# Patient Record
Sex: FEMALE | Race: WHITE | NOT HISPANIC OR LATINO | URBAN - METROPOLITAN AREA
[De-identification: names, ages, dates, MRNs, and addresses within clinical notes are randomized per-mention and may not be internally consistent; named-entity substitution may affect disease eponyms.]

---

## 2017-01-02 ENCOUNTER — INPATIENT (INPATIENT)
Facility: HOSPITAL | Age: 82
LOS: 8 days | Discharge: HOME | DRG: 871 | End: 2017-01-11
Admitting: STUDENT IN AN ORGANIZED HEALTH CARE EDUCATION/TRAINING PROGRAM
Payer: MEDICARE

## 2017-01-02 DIAGNOSIS — Z90.49 ACQUIRED ABSENCE OF OTHER SPECIFIED PARTS OF DIGESTIVE TRACT: Chronic | ICD-10-CM

## 2017-01-02 DIAGNOSIS — Z90.710 ACQUIRED ABSENCE OF BOTH CERVIX AND UTERUS: Chronic | ICD-10-CM

## 2017-01-02 DIAGNOSIS — Z90.89 ACQUIRED ABSENCE OF OTHER ORGANS: Chronic | ICD-10-CM

## 2017-01-02 DIAGNOSIS — Z98.89 OTHER SPECIFIED POSTPROCEDURAL STATES: Chronic | ICD-10-CM

## 2017-01-02 DIAGNOSIS — Z66 DO NOT RESUSCITATE: ICD-10-CM

## 2017-01-02 DIAGNOSIS — Z87.891 PERSONAL HISTORY OF NICOTINE DEPENDENCE: ICD-10-CM

## 2017-01-02 DIAGNOSIS — A41.9 SEPSIS, UNSPECIFIED ORGANISM: ICD-10-CM

## 2017-01-02 DIAGNOSIS — I48.2 CHRONIC ATRIAL FIBRILLATION: ICD-10-CM

## 2017-01-02 DIAGNOSIS — K21.9 GASTRO-ESOPHAGEAL REFLUX DISEASE WITHOUT ESOPHAGITIS: ICD-10-CM

## 2017-01-02 DIAGNOSIS — A41.89 OTHER SPECIFIED SEPSIS: ICD-10-CM

## 2017-01-02 DIAGNOSIS — N17.0 ACUTE KIDNEY FAILURE WITH TUBULAR NECROSIS: ICD-10-CM

## 2017-01-02 DIAGNOSIS — Z96.659 PRESENCE OF UNSPECIFIED ARTIFICIAL KNEE JOINT: ICD-10-CM

## 2017-01-02 DIAGNOSIS — J18.9 PNEUMONIA, UNSPECIFIED ORGANISM: ICD-10-CM

## 2017-01-02 DIAGNOSIS — E87.6 HYPOKALEMIA: ICD-10-CM

## 2017-01-02 PROCEDURE — 71010: CPT | Mod: 26

## 2017-01-02 PROCEDURE — 93010 ELECTROCARDIOGRAM REPORT: CPT

## 2017-01-02 PROCEDURE — 99223 1ST HOSP IP/OBS HIGH 75: CPT

## 2017-01-03 PROCEDURE — 99223 1ST HOSP IP/OBS HIGH 75: CPT

## 2017-01-03 PROCEDURE — 93010 ELECTROCARDIOGRAM REPORT: CPT

## 2017-01-03 PROCEDURE — 99233 SBSQ HOSP IP/OBS HIGH 50: CPT

## 2017-01-04 PROCEDURE — 71010: CPT | Mod: 26

## 2017-01-04 PROCEDURE — 78582 LUNG VENTILAT&PERFUS IMAGING: CPT | Mod: 26

## 2017-01-04 PROCEDURE — 93306 TTE W/DOPPLER COMPLETE: CPT | Mod: 26

## 2017-01-04 PROCEDURE — 99232 SBSQ HOSP IP/OBS MODERATE 35: CPT

## 2017-01-05 PROCEDURE — 99233 SBSQ HOSP IP/OBS HIGH 50: CPT

## 2017-01-06 PROCEDURE — 99232 SBSQ HOSP IP/OBS MODERATE 35: CPT

## 2017-01-06 PROCEDURE — 99233 SBSQ HOSP IP/OBS HIGH 50: CPT

## 2017-01-06 PROCEDURE — 71010: CPT | Mod: 26

## 2017-01-06 PROCEDURE — 93010 ELECTROCARDIOGRAM REPORT: CPT

## 2017-01-07 PROCEDURE — 99233 SBSQ HOSP IP/OBS HIGH 50: CPT

## 2017-01-07 PROCEDURE — 93010 ELECTROCARDIOGRAM REPORT: CPT

## 2017-01-07 PROCEDURE — 99232 SBSQ HOSP IP/OBS MODERATE 35: CPT

## 2017-01-08 PROCEDURE — 99233 SBSQ HOSP IP/OBS HIGH 50: CPT

## 2017-01-08 PROCEDURE — 99232 SBSQ HOSP IP/OBS MODERATE 35: CPT

## 2017-01-09 PROCEDURE — 99232 SBSQ HOSP IP/OBS MODERATE 35: CPT

## 2017-01-09 PROCEDURE — 99233 SBSQ HOSP IP/OBS HIGH 50: CPT

## 2017-01-10 PROCEDURE — 78582 LUNG VENTILAT&PERFUS IMAGING: CPT

## 2017-01-10 PROCEDURE — 99233 SBSQ HOSP IP/OBS HIGH 50: CPT

## 2017-01-10 PROCEDURE — 99232 SBSQ HOSP IP/OBS MODERATE 35: CPT

## 2017-01-10 PROCEDURE — 85730 THROMBOPLASTIN TIME PARTIAL: CPT

## 2017-01-10 PROCEDURE — 83735 ASSAY OF MAGNESIUM: CPT

## 2017-01-10 PROCEDURE — 87493 C DIFF AMPLIFIED PROBE: CPT

## 2017-01-10 PROCEDURE — 96375 TX/PRO/DX INJ NEW DRUG ADDON: CPT

## 2017-01-10 PROCEDURE — 96374 THER/PROPH/DIAG INJ IV PUSH: CPT

## 2017-01-10 PROCEDURE — 80076 HEPATIC FUNCTION PANEL: CPT

## 2017-01-10 PROCEDURE — 85025 COMPLETE CBC W/AUTO DIFF WBC: CPT

## 2017-01-10 PROCEDURE — 94640 AIRWAY INHALATION TREATMENT: CPT

## 2017-01-10 PROCEDURE — 80069 RENAL FUNCTION PANEL: CPT

## 2017-01-10 PROCEDURE — 85027 COMPLETE CBC AUTOMATED: CPT

## 2017-01-10 PROCEDURE — 85610 PROTHROMBIN TIME: CPT

## 2017-01-10 PROCEDURE — 83605 ASSAY OF LACTIC ACID: CPT

## 2017-01-10 PROCEDURE — A9540: CPT

## 2017-01-10 PROCEDURE — A9567: CPT

## 2017-01-10 PROCEDURE — 93306 TTE W/DOPPLER COMPLETE: CPT

## 2017-01-10 PROCEDURE — 80048 BASIC METABOLIC PNL TOTAL CA: CPT

## 2017-01-10 PROCEDURE — 87086 URINE CULTURE/COLONY COUNT: CPT

## 2017-01-10 PROCEDURE — 80053 COMPREHEN METABOLIC PANEL: CPT

## 2017-01-10 PROCEDURE — 99285 EMERGENCY DEPT VISIT HI MDM: CPT | Mod: 25

## 2017-01-10 PROCEDURE — 81003 URINALYSIS AUTO W/O SCOPE: CPT

## 2017-01-10 PROCEDURE — 71045 X-RAY EXAM CHEST 1 VIEW: CPT

## 2017-01-10 PROCEDURE — 93005 ELECTROCARDIOGRAM TRACING: CPT

## 2017-01-10 PROCEDURE — 83036 HEMOGLOBIN GLYCOSYLATED A1C: CPT

## 2017-01-10 PROCEDURE — 97001: CPT

## 2017-01-10 PROCEDURE — 84443 ASSAY THYROID STIM HORMONE: CPT

## 2017-01-10 PROCEDURE — 87040 BLOOD CULTURE FOR BACTERIA: CPT

## 2017-01-10 PROCEDURE — 97162 PT EVAL MOD COMPLEX 30 MIN: CPT

## 2017-01-10 PROCEDURE — 97116 GAIT TRAINING THERAPY: CPT

## 2017-01-11 PROCEDURE — 99233 SBSQ HOSP IP/OBS HIGH 50: CPT

## 2017-01-12 ENCOUNTER — MEDICATION RENEWAL (OUTPATIENT)
Age: 82
End: 2017-01-12

## 2017-01-17 RX ORDER — POTASSIUM CHLORIDE 1500 MG/1
20 TABLET, FILM COATED, EXTENDED RELEASE ORAL DAILY
Qty: 90 | Refills: 3 | Status: ACTIVE | COMMUNITY
Start: 2017-01-12 | End: 1900-01-01

## 2017-01-31 ENCOUNTER — APPOINTMENT (OUTPATIENT)
Dept: FAMILY MEDICINE | Facility: CLINIC | Age: 82
End: 2017-01-31

## 2017-01-31 VITALS
TEMPERATURE: 98.5 F | SYSTOLIC BLOOD PRESSURE: 128 MMHG | RESPIRATION RATE: 18 BRPM | HEART RATE: 80 BPM | DIASTOLIC BLOOD PRESSURE: 60 MMHG

## 2017-02-07 ENCOUNTER — NON-APPOINTMENT (OUTPATIENT)
Age: 82
End: 2017-02-07

## 2017-02-07 ENCOUNTER — APPOINTMENT (OUTPATIENT)
Dept: CARDIOLOGY | Facility: CLINIC | Age: 82
End: 2017-02-07

## 2017-02-07 VITALS
SYSTOLIC BLOOD PRESSURE: 169 MMHG | BODY MASS INDEX: 23.16 KG/M2 | WEIGHT: 139 LBS | OXYGEN SATURATION: 97 % | RESPIRATION RATE: 17 BRPM | HEART RATE: 64 BPM | HEIGHT: 65 IN | DIASTOLIC BLOOD PRESSURE: 78 MMHG

## 2017-02-07 RX ORDER — DILTIAZEM HYDROCHLORIDE 60 MG/1
60 TABLET ORAL
Qty: 360 | Refills: 3 | Status: DISCONTINUED | COMMUNITY
Start: 2017-01-12 | End: 2017-02-07

## 2017-02-28 ENCOUNTER — APPOINTMENT (OUTPATIENT)
Dept: CARDIOLOGY | Facility: CLINIC | Age: 82
End: 2017-02-28

## 2017-04-13 ENCOUNTER — OTHER (OUTPATIENT)
Age: 82
End: 2017-04-13

## 2017-05-02 ENCOUNTER — RX RENEWAL (OUTPATIENT)
Age: 82
End: 2017-05-02

## 2017-05-05 ENCOUNTER — APPOINTMENT (OUTPATIENT)
Dept: FAMILY MEDICINE | Facility: CLINIC | Age: 82
End: 2017-05-05

## 2017-05-05 VITALS — HEART RATE: 68 BPM | SYSTOLIC BLOOD PRESSURE: 158 MMHG | RESPIRATION RATE: 18 BRPM | DIASTOLIC BLOOD PRESSURE: 90 MMHG

## 2017-05-05 VITALS — DIASTOLIC BLOOD PRESSURE: 98 MMHG | SYSTOLIC BLOOD PRESSURE: 155 MMHG | TEMPERATURE: 98.8 F

## 2017-05-05 DIAGNOSIS — Z00.00 ENCOUNTER FOR GENERAL ADULT MEDICAL EXAMINATION W/OUT ABNORMAL FINDINGS: ICD-10-CM

## 2017-05-05 DIAGNOSIS — M19.90 UNSPECIFIED OSTEOARTHRITIS, UNSPECIFIED SITE: ICD-10-CM

## 2017-05-11 ENCOUNTER — FORM ENCOUNTER (OUTPATIENT)
Age: 82
End: 2017-05-11

## 2017-05-12 ENCOUNTER — APPOINTMENT (OUTPATIENT)
Dept: MAMMOGRAPHY | Facility: HOSPITAL | Age: 82
End: 2017-05-12

## 2017-05-12 ENCOUNTER — OUTPATIENT (OUTPATIENT)
Dept: OUTPATIENT SERVICES | Facility: HOSPITAL | Age: 82
LOS: 1 days | End: 2017-05-12
Payer: MEDICARE

## 2017-05-12 DIAGNOSIS — Z12.31 ENCOUNTER FOR SCREENING MAMMOGRAM FOR MALIGNANT NEOPLASM OF BREAST: ICD-10-CM

## 2017-05-12 DIAGNOSIS — Z90.89 ACQUIRED ABSENCE OF OTHER ORGANS: Chronic | ICD-10-CM

## 2017-05-12 DIAGNOSIS — Z98.89 OTHER SPECIFIED POSTPROCEDURAL STATES: Chronic | ICD-10-CM

## 2017-05-12 DIAGNOSIS — Z90.49 ACQUIRED ABSENCE OF OTHER SPECIFIED PARTS OF DIGESTIVE TRACT: Chronic | ICD-10-CM

## 2017-05-12 DIAGNOSIS — Z90.710 ACQUIRED ABSENCE OF BOTH CERVIX AND UTERUS: Chronic | ICD-10-CM

## 2017-05-12 PROCEDURE — 77063 BREAST TOMOSYNTHESIS BI: CPT

## 2017-05-12 PROCEDURE — 77067 SCR MAMMO BI INCL CAD: CPT

## 2017-07-19 ENCOUNTER — RX RENEWAL (OUTPATIENT)
Age: 82
End: 2017-07-19

## 2017-07-24 ENCOUNTER — MEDICATION RENEWAL (OUTPATIENT)
Age: 82
End: 2017-07-24

## 2017-08-22 ENCOUNTER — APPOINTMENT (OUTPATIENT)
Dept: CARDIOLOGY | Facility: CLINIC | Age: 82
End: 2017-08-22
Payer: MEDICARE

## 2017-08-22 ENCOUNTER — NON-APPOINTMENT (OUTPATIENT)
Age: 82
End: 2017-08-22

## 2017-08-22 VITALS
RESPIRATION RATE: 16 BRPM | BODY MASS INDEX: 25.33 KG/M2 | DIASTOLIC BLOOD PRESSURE: 78 MMHG | HEART RATE: 70 BPM | SYSTOLIC BLOOD PRESSURE: 129 MMHG | WEIGHT: 152 LBS | HEIGHT: 65 IN | OXYGEN SATURATION: 97 %

## 2017-08-22 DIAGNOSIS — Z87.898 PERSONAL HISTORY OF OTHER SPECIFIED CONDITIONS: ICD-10-CM

## 2017-08-22 PROCEDURE — 93000 ELECTROCARDIOGRAM COMPLETE: CPT

## 2017-08-22 PROCEDURE — 99214 OFFICE O/P EST MOD 30 MIN: CPT

## 2017-12-14 ENCOUNTER — RESULT REVIEW (OUTPATIENT)
Age: 82
End: 2017-12-14

## 2017-12-14 ENCOUNTER — INPATIENT (INPATIENT)
Facility: HOSPITAL | Age: 82
LOS: 6 days | Discharge: HOME | DRG: 329 | End: 2017-12-21
Attending: SURGERY | Admitting: SURGERY
Payer: MEDICARE

## 2017-12-14 DIAGNOSIS — R10.9 UNSPECIFIED ABDOMINAL PAIN: ICD-10-CM

## 2017-12-14 DIAGNOSIS — Z90.710 ACQUIRED ABSENCE OF BOTH CERVIX AND UTERUS: Chronic | ICD-10-CM

## 2017-12-14 DIAGNOSIS — Z90.49 ACQUIRED ABSENCE OF OTHER SPECIFIED PARTS OF DIGESTIVE TRACT: Chronic | ICD-10-CM

## 2017-12-14 DIAGNOSIS — Z98.89 OTHER SPECIFIED POSTPROCEDURAL STATES: Chronic | ICD-10-CM

## 2017-12-14 DIAGNOSIS — Z90.89 ACQUIRED ABSENCE OF OTHER ORGANS: Chronic | ICD-10-CM

## 2017-12-14 PROCEDURE — 88307 TISSUE EXAM BY PATHOLOGIST: CPT | Mod: 26

## 2017-12-14 PROCEDURE — 44140 PARTIAL REMOVAL OF COLON: CPT

## 2017-12-14 PROCEDURE — 99223 1ST HOSP IP/OBS HIGH 75: CPT

## 2017-12-14 PROCEDURE — 74177 CT ABD & PELVIS W/CONTRAST: CPT | Mod: 26

## 2017-12-14 PROCEDURE — 99223 1ST HOSP IP/OBS HIGH 75: CPT | Mod: 57,25

## 2017-12-14 PROCEDURE — 93010 ELECTROCARDIOGRAM REPORT: CPT

## 2017-12-15 ENCOUNTER — TRANSCRIPTION ENCOUNTER (OUTPATIENT)
Age: 82
End: 2017-12-15

## 2017-12-15 PROCEDURE — 93306 TTE W/DOPPLER COMPLETE: CPT | Mod: 26

## 2017-12-15 PROCEDURE — 99232 SBSQ HOSP IP/OBS MODERATE 35: CPT

## 2017-12-16 PROCEDURE — 99223 1ST HOSP IP/OBS HIGH 75: CPT

## 2017-12-16 PROCEDURE — 70450 CT HEAD/BRAIN W/O DYE: CPT | Mod: 26

## 2017-12-16 PROCEDURE — 99232 SBSQ HOSP IP/OBS MODERATE 35: CPT

## 2017-12-17 PROCEDURE — 70450 CT HEAD/BRAIN W/O DYE: CPT | Mod: 26

## 2017-12-17 PROCEDURE — 93880 EXTRACRANIAL BILAT STUDY: CPT | Mod: 26

## 2017-12-17 PROCEDURE — 99231 SBSQ HOSP IP/OBS SF/LOW 25: CPT

## 2017-12-18 PROCEDURE — 99232 SBSQ HOSP IP/OBS MODERATE 35: CPT

## 2017-12-19 PROCEDURE — 99232 SBSQ HOSP IP/OBS MODERATE 35: CPT

## 2017-12-19 PROCEDURE — 71010: CPT | Mod: 26

## 2017-12-21 PROCEDURE — 85027 COMPLETE CBC AUTOMATED: CPT

## 2017-12-21 PROCEDURE — 85730 THROMBOPLASTIN TIME PARTIAL: CPT

## 2017-12-21 PROCEDURE — 80048 BASIC METABOLIC PNL TOTAL CA: CPT

## 2017-12-21 PROCEDURE — 93005 ELECTROCARDIOGRAM TRACING: CPT

## 2017-12-21 PROCEDURE — 71045 X-RAY EXAM CHEST 1 VIEW: CPT

## 2017-12-21 PROCEDURE — C8929: CPT

## 2017-12-21 PROCEDURE — 93880 EXTRACRANIAL BILAT STUDY: CPT

## 2017-12-21 PROCEDURE — 96374 THER/PROPH/DIAG INJ IV PUSH: CPT | Mod: XU

## 2017-12-21 PROCEDURE — 81003 URINALYSIS AUTO W/O SCOPE: CPT

## 2017-12-21 PROCEDURE — 97161 PT EVAL LOW COMPLEX 20 MIN: CPT

## 2017-12-21 PROCEDURE — 82150 ASSAY OF AMYLASE: CPT

## 2017-12-21 PROCEDURE — 96375 TX/PRO/DX INJ NEW DRUG ADDON: CPT

## 2017-12-21 PROCEDURE — 83036 HEMOGLOBIN GLYCOSYLATED A1C: CPT

## 2017-12-21 PROCEDURE — 80069 RENAL FUNCTION PANEL: CPT

## 2017-12-21 PROCEDURE — 97116 GAIT TRAINING THERAPY: CPT

## 2017-12-21 PROCEDURE — 99285 EMERGENCY DEPT VISIT HI MDM: CPT | Mod: 25

## 2017-12-21 PROCEDURE — 87070 CULTURE OTHR SPECIMN AEROBIC: CPT

## 2017-12-21 PROCEDURE — 70450 CT HEAD/BRAIN W/O DYE: CPT

## 2017-12-21 PROCEDURE — 80053 COMPREHEN METABOLIC PANEL: CPT

## 2017-12-21 PROCEDURE — 83721 ASSAY OF BLOOD LIPOPROTEIN: CPT

## 2017-12-21 PROCEDURE — 87075 CULTR BACTERIA EXCEPT BLOOD: CPT

## 2017-12-21 PROCEDURE — 83690 ASSAY OF LIPASE: CPT

## 2017-12-21 PROCEDURE — 88307 TISSUE EXAM BY PATHOLOGIST: CPT

## 2017-12-21 PROCEDURE — 74177 CT ABD & PELVIS W/CONTRAST: CPT

## 2017-12-21 PROCEDURE — 85610 PROTHROMBIN TIME: CPT

## 2017-12-21 PROCEDURE — 87205 SMEAR GRAM STAIN: CPT

## 2017-12-29 ENCOUNTER — APPOINTMENT (OUTPATIENT)
Dept: SURGERY | Facility: CLINIC | Age: 82
End: 2017-12-29
Payer: MEDICARE

## 2017-12-29 VITALS — WEIGHT: 150 LBS | BODY MASS INDEX: 25.61 KG/M2 | HEIGHT: 64 IN

## 2017-12-29 PROCEDURE — 99024 POSTOP FOLLOW-UP VISIT: CPT

## 2017-12-29 RX ORDER — DICYCLOMINE HYDROCHLORIDE 10 MG/1
10 CAPSULE ORAL
Qty: 180 | Refills: 0 | Status: ACTIVE | COMMUNITY
Start: 2017-10-02

## 2017-12-29 RX ORDER — NITROFURANTOIN (MONOHYDRATE/MACROCRYSTALS) 25; 75 MG/1; MG/1
100 CAPSULE ORAL
Qty: 14 | Refills: 0 | Status: ACTIVE | COMMUNITY
Start: 2017-09-11

## 2017-12-29 RX ORDER — POTASSIUM CHLORIDE 1500 MG/1
20 TABLET, EXTENDED RELEASE ORAL
Qty: 90 | Refills: 0 | Status: ACTIVE | COMMUNITY
Start: 2017-01-17

## 2017-12-29 RX ORDER — CEFUROXIME AXETIL 500 MG/1
500 TABLET ORAL
Qty: 14 | Refills: 0 | Status: ACTIVE | COMMUNITY
Start: 2017-07-03

## 2017-12-29 RX ORDER — DORZOLAMIDE HYDROCHLORIDE TIMOLOL MALEATE 20; 5 MG/ML; MG/ML
22.3-6.8 SOLUTION/ DROPS OPHTHALMIC
Qty: 30 | Refills: 0 | Status: ACTIVE | COMMUNITY
Start: 2017-08-28

## 2017-12-29 RX ORDER — DICLOFENAC SODIUM 20 MG/G
2 SOLUTION TOPICAL
Qty: 112 | Refills: 0 | Status: ACTIVE | COMMUNITY
Start: 2017-07-28

## 2017-12-29 RX ORDER — ONDANSETRON 4 MG/1
4 TABLET ORAL
Qty: 9 | Refills: 0 | Status: ACTIVE | COMMUNITY
Start: 2017-12-21

## 2017-12-29 RX ORDER — OXYCODONE AND ACETAMINOPHEN 5; 325 MG/1; MG/1
5-325 TABLET ORAL
Qty: 6 | Refills: 0 | Status: ACTIVE | COMMUNITY
Start: 2017-12-21

## 2017-12-29 RX ORDER — PHENAZOPYRIDINE HYDROCHLORIDE 100 MG/1
100 TABLET ORAL
Qty: 6 | Refills: 0 | Status: ACTIVE | COMMUNITY
Start: 2017-10-02

## 2017-12-29 RX ORDER — FLUOROURACIL 50 MG/G
5 CREAM TOPICAL
Qty: 40 | Refills: 0 | Status: ACTIVE | COMMUNITY
Start: 2017-08-17

## 2018-01-19 ENCOUNTER — APPOINTMENT (OUTPATIENT)
Dept: SURGERY | Facility: CLINIC | Age: 83
End: 2018-01-19
Payer: MEDICARE

## 2018-01-19 PROCEDURE — 99024 POSTOP FOLLOW-UP VISIT: CPT

## 2018-01-20 ENCOUNTER — RX RENEWAL (OUTPATIENT)
Age: 83
End: 2018-01-20

## 2018-03-06 ENCOUNTER — NON-APPOINTMENT (OUTPATIENT)
Age: 83
End: 2018-03-06

## 2018-03-06 ENCOUNTER — APPOINTMENT (OUTPATIENT)
Dept: CARDIOLOGY | Facility: CLINIC | Age: 83
End: 2018-03-06
Payer: MEDICARE

## 2018-03-06 VITALS
OXYGEN SATURATION: 99 % | HEIGHT: 64 IN | RESPIRATION RATE: 16 BRPM | DIASTOLIC BLOOD PRESSURE: 79 MMHG | SYSTOLIC BLOOD PRESSURE: 131 MMHG | HEART RATE: 68 BPM | BODY MASS INDEX: 25.95 KG/M2 | WEIGHT: 152 LBS

## 2018-03-06 DIAGNOSIS — K56.2 VOLVULUS: ICD-10-CM

## 2018-03-06 PROCEDURE — 93000 ELECTROCARDIOGRAM COMPLETE: CPT

## 2018-03-06 PROCEDURE — 99215 OFFICE O/P EST HI 40 MIN: CPT

## 2018-03-06 PROCEDURE — 93306 TTE W/DOPPLER COMPLETE: CPT

## 2018-03-21 ENCOUNTER — APPOINTMENT (OUTPATIENT)
Dept: SURGERY | Facility: CLINIC | Age: 83
End: 2018-03-21

## 2018-04-02 ENCOUNTER — RX RENEWAL (OUTPATIENT)
Age: 83
End: 2018-04-02

## 2018-05-14 ENCOUNTER — APPOINTMENT (OUTPATIENT)
Dept: MAMMOGRAPHY | Facility: HOSPITAL | Age: 83
End: 2018-05-14
Payer: MEDICARE

## 2018-05-14 ENCOUNTER — OUTPATIENT (OUTPATIENT)
Dept: OUTPATIENT SERVICES | Facility: HOSPITAL | Age: 83
LOS: 1 days | End: 2018-05-14
Payer: MEDICARE

## 2018-05-14 DIAGNOSIS — Z90.710 ACQUIRED ABSENCE OF BOTH CERVIX AND UTERUS: Chronic | ICD-10-CM

## 2018-05-14 DIAGNOSIS — Z00.8 ENCOUNTER FOR OTHER GENERAL EXAMINATION: ICD-10-CM

## 2018-05-14 DIAGNOSIS — Z98.89 OTHER SPECIFIED POSTPROCEDURAL STATES: Chronic | ICD-10-CM

## 2018-05-14 DIAGNOSIS — Z90.89 ACQUIRED ABSENCE OF OTHER ORGANS: Chronic | ICD-10-CM

## 2018-05-14 DIAGNOSIS — Z90.49 ACQUIRED ABSENCE OF OTHER SPECIFIED PARTS OF DIGESTIVE TRACT: Chronic | ICD-10-CM

## 2018-05-14 DIAGNOSIS — Z12.31 ENCOUNTER FOR SCREENING MAMMOGRAM FOR MALIGNANT NEOPLASM OF BREAST: ICD-10-CM

## 2018-05-14 PROCEDURE — 77063 BREAST TOMOSYNTHESIS BI: CPT

## 2018-05-14 PROCEDURE — 77067 SCR MAMMO BI INCL CAD: CPT | Mod: 26

## 2018-05-14 PROCEDURE — 77067 SCR MAMMO BI INCL CAD: CPT

## 2018-05-14 PROCEDURE — 77063 BREAST TOMOSYNTHESIS BI: CPT | Mod: 26

## 2018-07-02 ENCOUNTER — RX RENEWAL (OUTPATIENT)
Age: 83
End: 2018-07-02

## 2018-08-14 ENCOUNTER — INPATIENT (INPATIENT)
Facility: HOSPITAL | Age: 83
LOS: 1 days | Discharge: HOME | DRG: 69 | End: 2018-08-16
Attending: HOSPITALIST | Admitting: INTERNAL MEDICINE
Payer: COMMERCIAL

## 2018-08-14 VITALS
HEIGHT: 65 IN | WEIGHT: 149.91 LBS | SYSTOLIC BLOOD PRESSURE: 163 MMHG | TEMPERATURE: 97 F | DIASTOLIC BLOOD PRESSURE: 80 MMHG | OXYGEN SATURATION: 99 % | HEART RATE: 60 BPM | RESPIRATION RATE: 16 BRPM

## 2018-08-14 DIAGNOSIS — Z90.49 ACQUIRED ABSENCE OF OTHER SPECIFIED PARTS OF DIGESTIVE TRACT: Chronic | ICD-10-CM

## 2018-08-14 DIAGNOSIS — H40.9 UNSPECIFIED GLAUCOMA: ICD-10-CM

## 2018-08-14 DIAGNOSIS — Z90.89 ACQUIRED ABSENCE OF OTHER ORGANS: Chronic | ICD-10-CM

## 2018-08-14 DIAGNOSIS — I34.1 NONRHEUMATIC MITRAL (VALVE) PROLAPSE: ICD-10-CM

## 2018-08-14 DIAGNOSIS — Z98.89 OTHER SPECIFIED POSTPROCEDURAL STATES: Chronic | ICD-10-CM

## 2018-08-14 DIAGNOSIS — E78.1 PURE HYPERGLYCERIDEMIA: ICD-10-CM

## 2018-08-14 DIAGNOSIS — I63.9 CEREBRAL INFARCTION, UNSPECIFIED: ICD-10-CM

## 2018-08-14 DIAGNOSIS — Z90.710 ACQUIRED ABSENCE OF BOTH CERVIX AND UTERUS: Chronic | ICD-10-CM

## 2018-08-14 DIAGNOSIS — Z29.9 ENCOUNTER FOR PROPHYLACTIC MEASURES, UNSPECIFIED: ICD-10-CM

## 2018-08-14 DIAGNOSIS — I10 ESSENTIAL (PRIMARY) HYPERTENSION: ICD-10-CM

## 2018-08-14 LAB
ALBUMIN SERPL ELPH-MCNC: 3.5 G/DL — SIGNIFICANT CHANGE UP (ref 3.3–5)
ALP SERPL-CCNC: 62 U/L — SIGNIFICANT CHANGE UP (ref 40–120)
ALT FLD-CCNC: 22 U/L DA — SIGNIFICANT CHANGE UP (ref 10–45)
ANION GAP SERPL CALC-SCNC: 8 MMOL/L — SIGNIFICANT CHANGE UP (ref 5–17)
APPEARANCE UR: CLEAR — SIGNIFICANT CHANGE UP
APTT BLD: 34.9 SEC — SIGNIFICANT CHANGE UP (ref 27.5–37.4)
AST SERPL-CCNC: 26 U/L — SIGNIFICANT CHANGE UP (ref 10–40)
BASOPHILS # BLD AUTO: 0 K/UL — SIGNIFICANT CHANGE UP (ref 0–0.2)
BASOPHILS NFR BLD AUTO: 0.7 % — SIGNIFICANT CHANGE UP (ref 0–2)
BILIRUB SERPL-MCNC: 0.3 MG/DL — SIGNIFICANT CHANGE UP (ref 0.2–1.2)
BILIRUB UR-MCNC: NEGATIVE — SIGNIFICANT CHANGE UP
BUN SERPL-MCNC: 20 MG/DL — SIGNIFICANT CHANGE UP (ref 7–23)
CALCIUM SERPL-MCNC: 9.9 MG/DL — SIGNIFICANT CHANGE UP (ref 8.4–10.5)
CHLORIDE SERPL-SCNC: 110 MMOL/L — HIGH (ref 96–108)
CHOLEST SERPL-MCNC: 213 MG/DL — HIGH (ref 10–199)
CO2 SERPL-SCNC: 26 MMOL/L — SIGNIFICANT CHANGE UP (ref 22–31)
COLOR SPEC: YELLOW — SIGNIFICANT CHANGE UP
CREAT SERPL-MCNC: 1.13 MG/DL — SIGNIFICANT CHANGE UP (ref 0.5–1.3)
DIFF PNL FLD: NEGATIVE — SIGNIFICANT CHANGE UP
EOSINOPHIL # BLD AUTO: 0.1 K/UL — SIGNIFICANT CHANGE UP (ref 0–0.5)
EOSINOPHIL NFR BLD AUTO: 2.2 % — SIGNIFICANT CHANGE UP (ref 0–6)
GLUCOSE SERPL-MCNC: 92 MG/DL — SIGNIFICANT CHANGE UP (ref 70–99)
GLUCOSE UR QL: NEGATIVE — SIGNIFICANT CHANGE UP
HCT VFR BLD CALC: 38.2 % — SIGNIFICANT CHANGE UP (ref 34.5–45)
HDLC SERPL-MCNC: 71 MG/DL — SIGNIFICANT CHANGE UP (ref 40–125)
HGB BLD-MCNC: 12.7 G/DL — SIGNIFICANT CHANGE UP (ref 11.5–15.5)
INR BLD: 1.04 RATIO — SIGNIFICANT CHANGE UP (ref 0.88–1.16)
KETONES UR-MCNC: NEGATIVE — SIGNIFICANT CHANGE UP
LEUKOCYTE ESTERASE UR-ACNC: NEGATIVE — SIGNIFICANT CHANGE UP
LIPID PNL WITH DIRECT LDL SERPL: 120 MG/DL — SIGNIFICANT CHANGE UP
LYMPHOCYTES # BLD AUTO: 1.2 K/UL — SIGNIFICANT CHANGE UP (ref 1–3.3)
LYMPHOCYTES # BLD AUTO: 21.1 % — SIGNIFICANT CHANGE UP (ref 13–44)
MCHC RBC-ENTMCNC: 32.5 PG — SIGNIFICANT CHANGE UP (ref 27–34)
MCHC RBC-ENTMCNC: 33.3 GM/DL — SIGNIFICANT CHANGE UP (ref 32–36)
MCV RBC AUTO: 97.7 FL — SIGNIFICANT CHANGE UP (ref 80–100)
MONOCYTES # BLD AUTO: 0.6 K/UL — SIGNIFICANT CHANGE UP (ref 0–0.9)
MONOCYTES NFR BLD AUTO: 10 % — HIGH (ref 1–9)
NEUTROPHILS # BLD AUTO: 3.8 K/UL — SIGNIFICANT CHANGE UP (ref 1.8–7.4)
NEUTROPHILS NFR BLD AUTO: 66 % — SIGNIFICANT CHANGE UP (ref 43–77)
NITRITE UR-MCNC: NEGATIVE — SIGNIFICANT CHANGE UP
PH UR: 8 — SIGNIFICANT CHANGE UP (ref 5–8)
PLATELET # BLD AUTO: 242 K/UL — SIGNIFICANT CHANGE UP (ref 150–400)
POTASSIUM SERPL-MCNC: 4.1 MMOL/L — SIGNIFICANT CHANGE UP (ref 3.5–5.3)
POTASSIUM SERPL-SCNC: 4.1 MMOL/L — SIGNIFICANT CHANGE UP (ref 3.5–5.3)
PROT SERPL-MCNC: 7.2 G/DL — SIGNIFICANT CHANGE UP (ref 6–8.3)
PROT UR-MCNC: NEGATIVE — SIGNIFICANT CHANGE UP
PROTHROM AB SERPL-ACNC: 11.5 SEC — SIGNIFICANT CHANGE UP (ref 9.8–12.7)
RBC # BLD: 3.91 M/UL — SIGNIFICANT CHANGE UP (ref 3.8–5.2)
RBC # FLD: 12.8 % — SIGNIFICANT CHANGE UP (ref 10.3–14.5)
SODIUM SERPL-SCNC: 144 MMOL/L — SIGNIFICANT CHANGE UP (ref 135–145)
SP GR SPEC: 1.01 — SIGNIFICANT CHANGE UP (ref 1.01–1.02)
TOTAL CHOLESTEROL/HDL RATIO MEASUREMENT: 3 RATIO — LOW (ref 3.3–7.1)
TRIGL SERPL-MCNC: 112 MG/DL — SIGNIFICANT CHANGE UP (ref 10–149)
TROPONIN I SERPL-MCNC: <.017 NG/ML — LOW (ref 0.02–0.06)
UROBILINOGEN FLD QL: NEGATIVE — SIGNIFICANT CHANGE UP
WBC # BLD: 5.8 K/UL — SIGNIFICANT CHANGE UP (ref 3.8–10.5)
WBC # FLD AUTO: 5.8 K/UL — SIGNIFICANT CHANGE UP (ref 3.8–10.5)

## 2018-08-14 PROCEDURE — 71045 X-RAY EXAM CHEST 1 VIEW: CPT | Mod: 26

## 2018-08-14 PROCEDURE — 99223 1ST HOSP IP/OBS HIGH 75: CPT

## 2018-08-14 PROCEDURE — 99285 EMERGENCY DEPT VISIT HI MDM: CPT

## 2018-08-14 PROCEDURE — 93010 ELECTROCARDIOGRAM REPORT: CPT

## 2018-08-14 PROCEDURE — 70450 CT HEAD/BRAIN W/O DYE: CPT | Mod: 26

## 2018-08-14 RX ORDER — AMLODIPINE BESYLATE 2.5 MG/1
5 TABLET ORAL DAILY
Qty: 0 | Refills: 0 | Status: DISCONTINUED | OUTPATIENT
Start: 2018-08-14 | End: 2018-08-16

## 2018-08-14 RX ORDER — ATORVASTATIN CALCIUM 80 MG/1
40 TABLET, FILM COATED ORAL AT BEDTIME
Qty: 0 | Refills: 0 | Status: DISCONTINUED | OUTPATIENT
Start: 2018-08-14 | End: 2018-08-16

## 2018-08-14 RX ORDER — HEPARIN SODIUM 5000 [USP'U]/ML
5000 INJECTION INTRAVENOUS; SUBCUTANEOUS EVERY 8 HOURS
Qty: 0 | Refills: 0 | Status: DISCONTINUED | OUTPATIENT
Start: 2018-08-14 | End: 2018-08-16

## 2018-08-14 RX ORDER — ASPIRIN/CALCIUM CARB/MAGNESIUM 324 MG
81 TABLET ORAL DAILY
Qty: 0 | Refills: 0 | Status: DISCONTINUED | OUTPATIENT
Start: 2018-08-14 | End: 2018-08-14

## 2018-08-14 RX ORDER — DILTIAZEM HCL 120 MG
180 CAPSULE, EXT RELEASE 24 HR ORAL DAILY
Qty: 0 | Refills: 0 | Status: DISCONTINUED | OUTPATIENT
Start: 2018-08-14 | End: 2018-08-16

## 2018-08-14 RX ORDER — ASPIRIN/CALCIUM CARB/MAGNESIUM 324 MG
81 TABLET ORAL DAILY
Qty: 0 | Refills: 0 | Status: DISCONTINUED | OUTPATIENT
Start: 2018-08-14 | End: 2018-08-16

## 2018-08-14 RX ADMIN — HEPARIN SODIUM 5000 UNIT(S): 5000 INJECTION INTRAVENOUS; SUBCUTANEOUS at 21:05

## 2018-08-14 RX ADMIN — ATORVASTATIN CALCIUM 40 MILLIGRAM(S): 80 TABLET, FILM COATED ORAL at 21:05

## 2018-08-14 NOTE — H&P ADULT - ASSESSMENT
89 y/o F with HTN, HLD, arthritis, glaucoma presents with unsteady gait of acute onset, and slurred speech (per daughter)

## 2018-08-14 NOTE — ED PROVIDER NOTE - ATTENDING CONTRIBUTION TO CARE
Pt presenting with ataxia - new since this AM.  Also some confusion compared to her baseline Lives independently.    Gen: Well appearing in NAD  Head: NC/AT  Neck: trachea midline  Resp:  No distress  Ext: no deformities  Neuro:  A&O appears non focal except for ataxia  Skin:  Warm and dry as visualized  Psych:  Normal affect and mood    Pt with s/s concerning for stroke.  Will get imaging and labs.  Will require neuro consult and admission

## 2018-08-14 NOTE — CONSULT NOTE ADULT - ASSESSMENT
88 yo woman with HTN remote h/o vertigo and polymyalgia rheumatica and acute onset gait ataxia this AM.  There is improvement since onset but unsteadiness persists. She denies other CNS focality. There is no vertigo or cephalgia.  Suspect small posterior circulation CVA  probably affecting cerebellum.      REC:  for now observe, continue ASA, add statin, check lipids, carotid sonogram, TTE, telemetry, DVT prophylaxis, BP control, physiotherapy for gait and balance, repeat CT head 8/15, consider brain MRI/A in or outpatient depending on patient's progress.

## 2018-08-14 NOTE — ED PROVIDER NOTE - MEDICAL DECISION MAKING DETAILS
Imbalance, episode of confusion- confusion now resolved. Pt with NIH scale 3- last normal last night- r/o CVA vs infxn; CT head, cbc, cmp, troponin, ekg, cxr, ua.

## 2018-08-14 NOTE — PATIENT PROFILE ADULT. - NS TRANSFER PATIENT BELONGINGS
Cell Phone/PDA (specify)/bracelets x2, flip flops/Clothing/Other belongings/Jewelry/Money (specify) Cell Phone/PDA (specify)/bracelets x2, flip flops, rings x3/Other belongings/Clothing/Jewelry/Money (specify)

## 2018-08-14 NOTE — H&P ADULT - PSH
S/P appendectomy    S/P bladder repair  prolapsed bladder and uterus  all done w hysterectomy  S/P breast biopsy, right  benign  S/P cholecystectomy    S/P hysterectomy  partial  S/P tonsillectomy

## 2018-08-14 NOTE — H&P ADULT - NSHPPHYSICALEXAM_GEN_ALL_CORE
PHYSICAL EXAM:  GENERAL: NAD, well-groomed, well-developed  HEAD:  Atraumatic, Normocephalic  EYES: EOMI, PERRLA, conjunctiva and sclera clear  ENMT: No tonsillar erythema, exudates, or enlargement; Moist mucous membranes, Good dentition  NECK: Supple, No JVD  CHEST/LUNG: Clear to auscultation bilaterally; No rales, rhonchi, wheezing, or rubs  HEART: Regular rate and rhythm; S1/S2, No murmurs, rubs, or gallops  ABDOMEN: Soft, Nontender, Nondistended; Bowel sounds present  VASCULAR: Normal pulses, Normal capillary refill  EXTREMITIES:  2+ Peripheral Pulses, No clubbing, cyanosis, or edema  SKIN: Warm, Intact  PSYCH: Normal mood and affect  NERVOUS SYSTEM:  A/O x3, Good concentration; CN 2-12 intact, No focal deficits

## 2018-08-14 NOTE — H&P ADULT - NSHPREVIEWOFSYSTEMS_GEN_ALL_CORE
REVIEW OF SYSTEMS:  CONSTITUTIONAL: No fever, weight loss, or fatigue  RESPIRATORY: No cough, wheezing, chills or hemoptysis; No shortness of breath  CARDIOVASCULAR: No chest pain, palpitations, dizziness, or leg swelling  GASTROINTESTINAL: No abdominal pain, No nausea, vomiting, or hematemesis; No diarrhea or constipation  GENITOURINARY: No dysuria, frequency, hematuria, or incontinence  NEUROLOGICAL: No headaches, memory loss, loss of strength, numbness, or tremors, +loss of balanace   SKIN: No itching, burning, rashes, or lesions   MUSCULOSKELETAL: No joint pain or swelling; No muscle, back, or extremity pain          All other ROS reviewed and negative except as otherwise stated

## 2018-08-14 NOTE — ED PROVIDER NOTE - PROGRESS NOTE DETAILS
PA Base[facility]: CT results reviewed with patient and family- L sided decreased NLF is improved, with now even smile. Patient ambulated to the bathroom requiring less assistance although still slightly unsteady. Will await labs and get MRI to r/o CVA vs TIA

## 2018-08-14 NOTE — ED ADULT NURSE NOTE - NSIMPLEMENTINTERV_GEN_ALL_ED
Implemented All Fall with Harm Risk Interventions:  Goode to call system. Call bell, personal items and telephone within reach. Instruct patient to call for assistance. Room bathroom lighting operational. Non-slip footwear when patient is off stretcher. Physically safe environment: no spills, clutter or unnecessary equipment. Stretcher in lowest position, wheels locked, appropriate side rails in place. Provide visual cue, wrist band, yellow gown, etc. Monitor gait and stability. Monitor for mental status changes and reorient to person, place, and time. Review medications for side effects contributing to fall risk. Reinforce activity limits and safety measures with patient and family. Provide visual clues: red socks.

## 2018-08-14 NOTE — ED PROVIDER NOTE - CHPI ED SYMPTOMS NEG
no blurred vision/no confusion/no numbness/no fever/no loss of consciousness/no nausea/no change in level of consciousness/no vomiting

## 2018-08-14 NOTE — ED PROVIDER NOTE - PMH
GERD (gastroesophageal reflux disease)    Glaucoma    History of osteoarthritis    Hypertension    MVP (mitral valve prolapse)  possibly hx leaky valve

## 2018-08-14 NOTE — H&P ADULT - NSHPLABSRESULTS_GEN_ALL_CORE
12.7   5.8   )-----------( 242      ( 14 Aug 2018 12:28 )             38.2       -    144  |  110<H>  |  20  ----------------------------<  92  4.1   |  26  |  1.13    Ca    9.9      14 Aug 2018 12:28    TPro  7.2  /  Alb  3.5  /  TBili  0.3  /  DBili  x   /  AST  26  /  ALT  22  /  AlkPhos  62  08-14      PT/INR - ( 14 Aug 2018 12:28 )   PT: 11.5 sec;   INR: 1.04 ratio         PTT - ( 14 Aug 2018 12:28 )  PTT:34.9 sec    CARDIAC MARKERS ( 14 Aug 2018 12:28 )  <.017 ng/mL / x     / x     / x     / x            Urinalysis Basic - ( 14 Aug 2018 13:00 )    Color: Yellow / Appearance: Clear / S.015 / pH: x  Gluc: x / Ketone: Negative  / Bili: Negative / Urobili: Negative   Blood: x / Protein: Negative / Nitrite: Negative   Leuk Esterase: Negative / RBC: x / WBC x   Sq Epi: x / Non Sq Epi: x / Bacteria: x  < from: CT Head No Cont (18 @ 12:05) >    IMPRESSION:  No acute intracranial hemorrhage or mass effect. Further evaluation with   MRI may be performed as clinically indicated.     < end of copied text >    < from: Xray Chest 1 View- PORTABLE-Urgent (18 @ 12:49) >    Impression: No active disease. Stable exam.    < end of copied text >

## 2018-08-14 NOTE — ED PROVIDER NOTE - OBJECTIVE STATEMENT
89 year old female, PMHx of HTN, HLD, presents to the ED complaining of unsteady gate. Upon awakening this morning she states she was unsteady and had to hold onto the wall so she didn't fall over. She woke up at approximately 0700 when she felt the unsteadiness. When her daughter arrived the patient was speaking about withdrawal slips from the bank, which according to her daughter is not how she usually withdraws money. The patient takes her blood pressure every days and takes amlodipine for it. If the BP is high she then takes Xcardia; however, this morning patient took both despite her . Upon arrival, daughter states the patient seems more clear in thoughts than previous. Denies slurred speech, numbness, tingling, chest pain, shortness of breath or other complaints.

## 2018-08-14 NOTE — ED ADULT NURSE NOTE - OBJECTIVE STATEMENT
as per daughter she has an unsteady gait as per daughter she has an unsteady gait, patient states "I feel wobbly"

## 2018-08-14 NOTE — H&P ADULT - PROBLEM SELECTOR PLAN 6
DVT/GI ppx IMPROVE VTE Individual Risk Assessment    RISK                                                                Points    [  ] Previous VTE                                                  3    [  ] Thrombophilia                                               2  [  ] Lower limb paralysis                                      2        (unable to hold up >15 seconds)    [  ] Current Cancer                                              2         (within 6 months)  [  ] Immobilization > 24 hrs                                1  [  ] ICU/CCU stay > 24 hours                              1  [ ** ] Age > 60                                                      1  IMPROVE VTE Score ___1______

## 2018-08-14 NOTE — H&P ADULT - HISTORY OF PRESENT ILLNESS
88 y/o F with PMH HTN, "leaky" Mitral valve, arthritis, HLD presents after awaking today and feeling unsteady on her feet, which she never felt before. Patient's daughter states she usually does everyone on her own but today her speech was "off" and she was walking "wobbly". Patient denies any chest pain, SOB, headache, N/V, abd pain, fever, chills, etc.

## 2018-08-15 LAB
ANION GAP SERPL CALC-SCNC: 8 MMOL/L — SIGNIFICANT CHANGE UP (ref 5–17)
BUN SERPL-MCNC: 20 MG/DL — SIGNIFICANT CHANGE UP (ref 7–23)
CALCIUM SERPL-MCNC: 9.2 MG/DL — SIGNIFICANT CHANGE UP (ref 8.4–10.5)
CHLORIDE SERPL-SCNC: 105 MMOL/L — SIGNIFICANT CHANGE UP (ref 96–108)
CO2 SERPL-SCNC: 28 MMOL/L — SIGNIFICANT CHANGE UP (ref 22–31)
CREAT SERPL-MCNC: 1.1 MG/DL — SIGNIFICANT CHANGE UP (ref 0.5–1.3)
GLUCOSE SERPL-MCNC: 97 MG/DL — SIGNIFICANT CHANGE UP (ref 70–99)
HCT VFR BLD CALC: 36.3 % — SIGNIFICANT CHANGE UP (ref 34.5–45)
HGB BLD-MCNC: 12 G/DL — SIGNIFICANT CHANGE UP (ref 11.5–15.5)
MCHC RBC-ENTMCNC: 32.4 PG — SIGNIFICANT CHANGE UP (ref 27–34)
MCHC RBC-ENTMCNC: 33.1 GM/DL — SIGNIFICANT CHANGE UP (ref 32–36)
MCV RBC AUTO: 97.9 FL — SIGNIFICANT CHANGE UP (ref 80–100)
PLATELET # BLD AUTO: 242 K/UL — SIGNIFICANT CHANGE UP (ref 150–400)
POTASSIUM SERPL-MCNC: 3.6 MMOL/L — SIGNIFICANT CHANGE UP (ref 3.5–5.3)
POTASSIUM SERPL-SCNC: 3.6 MMOL/L — SIGNIFICANT CHANGE UP (ref 3.5–5.3)
RBC # BLD: 3.71 M/UL — LOW (ref 3.8–5.2)
RBC # FLD: 12.7 % — SIGNIFICANT CHANGE UP (ref 10.3–14.5)
SODIUM SERPL-SCNC: 141 MMOL/L — SIGNIFICANT CHANGE UP (ref 135–145)
TSH SERPL-MCNC: 3.09 UIU/ML — SIGNIFICANT CHANGE UP (ref 0.27–4.2)
WBC # BLD: 5.7 K/UL — SIGNIFICANT CHANGE UP (ref 3.8–10.5)
WBC # FLD AUTO: 5.7 K/UL — SIGNIFICANT CHANGE UP (ref 3.8–10.5)

## 2018-08-15 PROCEDURE — 70450 CT HEAD/BRAIN W/O DYE: CPT | Mod: 26

## 2018-08-15 PROCEDURE — 99233 SBSQ HOSP IP/OBS HIGH 50: CPT

## 2018-08-15 PROCEDURE — 93306 TTE W/DOPPLER COMPLETE: CPT | Mod: 26

## 2018-08-15 PROCEDURE — 93880 EXTRACRANIAL BILAT STUDY: CPT | Mod: 26

## 2018-08-15 RX ADMIN — Medication 10 MILLIGRAM(S): at 13:04

## 2018-08-15 RX ADMIN — Medication 180 MILLIGRAM(S): at 05:10

## 2018-08-15 RX ADMIN — AMLODIPINE BESYLATE 5 MILLIGRAM(S): 2.5 TABLET ORAL at 05:10

## 2018-08-15 RX ADMIN — HEPARIN SODIUM 5000 UNIT(S): 5000 INJECTION INTRAVENOUS; SUBCUTANEOUS at 13:03

## 2018-08-15 RX ADMIN — HEPARIN SODIUM 5000 UNIT(S): 5000 INJECTION INTRAVENOUS; SUBCUTANEOUS at 21:15

## 2018-08-15 RX ADMIN — Medication 81 MILLIGRAM(S): at 13:04

## 2018-08-15 RX ADMIN — HEPARIN SODIUM 5000 UNIT(S): 5000 INJECTION INTRAVENOUS; SUBCUTANEOUS at 05:11

## 2018-08-15 RX ADMIN — ATORVASTATIN CALCIUM 40 MILLIGRAM(S): 80 TABLET, FILM COATED ORAL at 21:16

## 2018-08-15 NOTE — PROGRESS NOTE ADULT - SUBJECTIVE AND OBJECTIVE BOX
Neurology Progress Note    Subjective & Objective: Residual mild gait ataxia.  No additional complaints.          Vital Signs Last 24 Hrs  T(C): 36.7 (15 Aug 2018 14:05), Max: 36.7 (15 Aug 2018 14:05)  T(F): 98 (15 Aug 2018 14:05), Max: 98 (15 Aug 2018 14:05)  HR: 77 (15 Aug 2018 14:05) (58 - 77)  BP: 133/62 (15 Aug 2018 14:05) (133/62 - 163/77)  BP(mean): --  RR: 16 (15 Aug 2018 14:05) (14 - 18)  SpO2: 98% (15 Aug 2018 14:05) (95% - 98%)                     Neurological Exam:  Mental Status: Alert orient speech intact no aphasia or dysarthria       Cranial Nerves: PERRL, EOMI, VFF, no nystagmus or diplopia.   Fundi not visualized bilaterally.  CN V through XII intact    Motor:   No drift  Tone: normal.                  Strength: intact throughout                Dysmeria: None to finger-nose-finger or heel-shin-heel     Sensation: intact to light touch, pinprick, vibration and proprioception    Deep Tendon Reflexes: symmetric @ 2+ bilaterally  Toes flexor bilaterally    Gait: unsteady      Lab:  08-15    141  |  105  |  20  ----------------------------<  97  3.6   |  28  |  1.10    Ca    9.2      15 Aug 2018 07:00    TPro  7.2  /  Alb  3.5  /  TBili  0.3  /  DBili  x   /  AST  26  /  ALT  22  /  AlkPhos  62  08-14    LIVER FUNCTIONS - ( 14 Aug 2018 12:28 )  Alb: 3.5 g/dL / Pro: 7.2 g/dL / ALK PHOS: 62 U/L / ALT: 22 U/L DA / AST: 26 U/L / GGT: x           total cholesterol 213 mg/dL  HDL 71 mg/dL   mg/dL                          12.0   5.7   )-----------( 242      ( 15 Aug 2018 07:00 )             36.3         Radiology: CT                       EEG:< from: CT Head No Cont (08.14.18 @ 12:05) >    EXAM:  CT BRAIN      PROCEDURE DATE:  08/14/2018        INTERPRETATION:  CT HEAD WITHOUT CONTRAST    INDICATION: 89 years old. Female. Unsteady gait, confusion. r/o cva.     COMPARISON: 12/17/2017.    TECHNIQUE: Noncontrast axial CT head was obtained from the skull base to   < from: US Duplex Carotid Arteries Complete, Bilateral (08.15.18 @ 12:34) >  M:  US DPLX CAROTIDS COMPL BI      PROCEDURE DATE:  08/15/2018        INTERPRETATION:  CLINICAL INFORMATION: CVA, unsteady gait and confusion.    COMPARISON: 12/17/2017    TECHNIQUE:    Color and spectral Doppler evaluation of the carotid arteries.    FINDINGS:    Blood flow velocities are as follows:    RIGHT (PSV/EDV in cm/s):        PROX CCA = 51 / 9       DIST CCA = 68 / 14       PROX ICA = 32 / 10       DIST ICA = 93 / 17       ECA = 76 / 5       ICA/CCA = 1.4    LEFT (PSV/EDV in cm/s):       PROX CCA = 68 / 14       DIST CCA = 60 / 15       PROX ICA = 51 / 12       DIST ICA = 95 / 25       ECA = 111 / 6       ICA/CCA = 1.6    Measurement of carotid stenosis is based on velocity parameters that   correlate the residual internal carotid diameter with that of the more   distal vessel in accordance with a method such as the [place] American   Symptomatic Carotid Endarterectomy Trial (NASCET).    Mild calcified intimal plaque is present in both carotid systems. No   disturbed color-flow or elevated blood flow velocities are encountered in   the internal carotid arteries. Antegrade flow is present in both   vertebral arteries.    IMPRESSION:    No duplex evidence of hemodynamically significant internal carotid artery   stenosis.    < end of copied text >  vertex.    FINDINGS:  No acute intracranial hemorrhage, mass effect or midline shift.  No CT evidence of acute large territory vascular infarct.  The ventricles and cortical sulci are prominent reflecting parenchymal   volume loss.  Patchy hypodensities in the periventricular white matter are nonspecific,   but likely sequela of small vessel ischemic disease.  Intracranial atherosclerotic calcifications are present.  Partially empty sella.    Mucus retention cyst/polypsin both maxillary sinuses. Mastoid air cells   are well aerated. The native ocular lenses are surgically absent.  No displaced calvarial fracture.    IMPRESSION:  No acute intracranial hemorrhage or mass effect. Further evaluation with   MRI may be performed as clinically indicated.       < end of copied text >        MEDICATIONS  (STANDING):  amLODIPine   Tablet 5 milliGRAM(s) Oral daily  aspirin  chewable 81 milliGRAM(s) Oral daily  atorvastatin 40 milliGRAM(s) Oral at bedtime  dicyclomine 10 milliGRAM(s) Oral daily  diltiazem    milliGRAM(s) Oral daily  heparin  Injectable 5000 Unit(s) SubCutaneous every 8 hours    MEDICATIONS  (PRN):

## 2018-08-15 NOTE — PROGRESS NOTE ADULT - ASSESSMENT
89 y/o F with HTN, HLD, arthritis, glaucoma presents with unsteady gait of acute onset, and slurred speech (per daughter).    symptoms have resolved.

## 2018-08-15 NOTE — PROGRESS NOTE ADULT - SUBJECTIVE AND OBJECTIVE BOX
Patient is a 89y old  Female who presents with a chief complaint of unsteady gait (14 Aug 2018 14:58)    Patient feels  back to baseline. Would like to leave by tomorrow. No chest pain, sob, nausea, vomiting, diarrhea.     Patient seen and examined at bedside.    ALLERGIES:  No Known Allergies    MEDICATIONS  (STANDING):  amLODIPine   Tablet 5 milliGRAM(s) Oral daily  aspirin  chewable 81 milliGRAM(s) Oral daily  atorvastatin 40 milliGRAM(s) Oral at bedtime  dicyclomine 10 milliGRAM(s) Oral daily  diltiazem    milliGRAM(s) Oral daily  heparin  Injectable 5000 Unit(s) SubCutaneous every 8 hours    MEDICATIONS  (PRN):    Vital Signs Last 24 Hrs  T(F): 97 (15 Aug 2018 05:00), Max: 97.2 (14 Aug 2018 11:11)  HR: 62 (15 Aug 2018 05:00) (58 - 67)  BP: 163/77 (15 Aug 2018 05:00) (139/81 - 163/80)  RR: 18 (15 Aug 2018 05:00) (14 - 18)  SpO2: 97% (15 Aug 2018 05:00) (95% - 99%)  I&O's Summary    14 Aug 2018 07:01  -  15 Aug 2018 07:00  --------------------------------------------------------  IN: 240 mL / OUT: 0 mL / NET: 240 mL    BMI (kg/m2): 22.2 (18 @ 18:33)  PHYSICAL EXAM:  General: NAD, A/O x 3  ENT: MMM  Neck: Supple, No JVD  Lungs: Clear to auscultation bilaterally  Cardio: RRR, S1/S2, No murmurs  Abdomen: Soft, Nontender, Nondistended; Bowel sounds present  Extremities: No calf tenderness, No pitting edema    LABS:                        12.0   5.7   )-----------( 242      ( 15 Aug 2018 07:00 )             36.3       08-15    141  |  105  |  20  ----------------------------<  97  3.6   |  28  |  1.10    Ca    9.2      15 Aug 2018 07:00    TPro  7.2  /  Alb  3.5  /  TBili  0.3  /  DBili  x   /  AST  26  /  ALT  22  /  AlkPhos  62  08-14     eGFR if Non African American: 44 mL/min/1.73M2 (08-15-18 @ 07:00)  eGFR if African American: 52 mL/min/1.73M2 (08-15-18 @ 07:00)    PT/INR - ( 14 Aug 2018 12:28 )   PT: 11.5 sec;   INR: 1.04 ratio       PTT - ( 14 Aug 2018 12:28 )  PTT:34.9 sec     CARDIAC MARKERS ( 14 Aug 2018 12:28 )  <.017 ng/mL / x     / x     / x     / x         Chol 213 mg/dL  mg/dL HDL 71 mg/dL Trig 112 mg/dL  TSH 3.09   TSH with FT4 reflex --  Total T3 --    CAPILLARY BLOOD GLUCOSE    Urinalysis Basic - ( 14 Aug 2018 13:00 )    Color: Yellow / Appearance: Clear / S.015 / pH: x  Gluc: x / Ketone: Negative  / Bili: Negative / Urobili: Negative   Blood: x / Protein: Negative / Nitrite: Negative   Leuk Esterase: Negative / RBC: x / WBC x   Sq Epi: x / Non Sq Epi: x / Bacteria: x    RADIOLOGY & ADDITIONAL TESTS:    Care Discussed with Consultants/Other Providers:

## 2018-08-15 NOTE — PROGRESS NOTE ADULT - ASSESSMENT
88 yo woman with new onset gait ataxia.  No addition complaints.  Gait improving since admission. CT on admission and carotid sonogram unremarkable.  Suspect small posterior fossa probable cerebellae infarct.    REC:  repeat head CT, ASA, statin, BP control, PT, outpatient MRI, consider rehab vs at home PT.

## 2018-08-16 ENCOUNTER — TRANSCRIPTION ENCOUNTER (OUTPATIENT)
Age: 83
End: 2018-08-16

## 2018-08-16 VITALS
HEART RATE: 66 BPM | SYSTOLIC BLOOD PRESSURE: 136 MMHG | RESPIRATION RATE: 16 BRPM | TEMPERATURE: 98 F | OXYGEN SATURATION: 98 % | DIASTOLIC BLOOD PRESSURE: 54 MMHG

## 2018-08-16 PROCEDURE — 93005 ELECTROCARDIOGRAM TRACING: CPT

## 2018-08-16 PROCEDURE — 80053 COMPREHEN METABOLIC PANEL: CPT

## 2018-08-16 PROCEDURE — 80048 BASIC METABOLIC PNL TOTAL CA: CPT

## 2018-08-16 PROCEDURE — 93306 TTE W/DOPPLER COMPLETE: CPT

## 2018-08-16 PROCEDURE — 70450 CT HEAD/BRAIN W/O DYE: CPT

## 2018-08-16 PROCEDURE — 84443 ASSAY THYROID STIM HORMONE: CPT

## 2018-08-16 PROCEDURE — 99285 EMERGENCY DEPT VISIT HI MDM: CPT

## 2018-08-16 PROCEDURE — 99239 HOSP IP/OBS DSCHRG MGMT >30: CPT

## 2018-08-16 PROCEDURE — 84484 ASSAY OF TROPONIN QUANT: CPT

## 2018-08-16 PROCEDURE — 71045 X-RAY EXAM CHEST 1 VIEW: CPT

## 2018-08-16 PROCEDURE — 97162 PT EVAL MOD COMPLEX 30 MIN: CPT

## 2018-08-16 PROCEDURE — 93880 EXTRACRANIAL BILAT STUDY: CPT

## 2018-08-16 PROCEDURE — 80061 LIPID PANEL: CPT

## 2018-08-16 PROCEDURE — 85610 PROTHROMBIN TIME: CPT

## 2018-08-16 PROCEDURE — 85027 COMPLETE CBC AUTOMATED: CPT

## 2018-08-16 PROCEDURE — 85730 THROMBOPLASTIN TIME PARTIAL: CPT

## 2018-08-16 RX ORDER — ATORVASTATIN CALCIUM 80 MG/1
1 TABLET, FILM COATED ORAL
Qty: 30 | Refills: 2
Start: 2018-08-16 | End: 2018-11-13

## 2018-08-16 RX ORDER — ATORVASTATIN CALCIUM 80 MG/1
1 TABLET, FILM COATED ORAL
Qty: 0 | Refills: 0 | COMMUNITY
Start: 2018-08-16

## 2018-08-16 RX ADMIN — Medication 180 MILLIGRAM(S): at 05:48

## 2018-08-16 RX ADMIN — Medication 81 MILLIGRAM(S): at 11:27

## 2018-08-16 RX ADMIN — HEPARIN SODIUM 5000 UNIT(S): 5000 INJECTION INTRAVENOUS; SUBCUTANEOUS at 05:48

## 2018-08-16 RX ADMIN — Medication 10 MILLIGRAM(S): at 11:28

## 2018-08-16 RX ADMIN — AMLODIPINE BESYLATE 5 MILLIGRAM(S): 2.5 TABLET ORAL at 05:48

## 2018-08-16 NOTE — PROGRESS NOTE ADULT - SUBJECTIVE AND OBJECTIVE BOX
Patient is a 89y old  Female who presents with a chief complaint of unsteady gait (14 Aug 2018 14:58)      Patient seen and examined at bedside.    ALLERGIES:  No Known Allergies    MEDICATIONS  (STANDING):  amLODIPine   Tablet 5 milliGRAM(s) Oral daily  aspirin  chewable 81 milliGRAM(s) Oral daily  atorvastatin 40 milliGRAM(s) Oral at bedtime  dicyclomine 10 milliGRAM(s) Oral daily  diltiazem    milliGRAM(s) Oral daily  heparin  Injectable 5000 Unit(s) SubCutaneous every 8 hours    MEDICATIONS  (PRN):    Vital Signs Last 24 Hrs  T(F): 96.9 (16 Aug 2018 05:00), Max: 98 (15 Aug 2018 14:05)  HR: 56 (16 Aug 2018 05:00) (56 - 77)  BP: 142/77 (16 Aug 2018 05:00) (130/78 - 142/77)  RR: 14 (16 Aug 2018 05:00) (14 - 16)  SpO2: 97% (16 Aug 2018 05:00) (95% - 98%)  I&O's Summary    15 Aug 2018 07:01  -  16 Aug 2018 07:00  --------------------------------------------------------  IN: 540 mL / OUT: 0 mL / NET: 540 mL      BMI (kg/m2): 22.2 (18 @ 18:33)  PHYSICAL EXAM:  General: NAD, A/O x 3  ENT: MMM  Neck: Supple, No JVD  Lungs: Clear to auscultation bilaterally  Cardio: RRR, S1/S2, No murmurs  Abdomen: Soft, Nontender, Nondistended; Bowel sounds present  Extremities: No calf tenderness, No pitting edema    LABS:                        12.0   5.7   )-----------( 242      ( 15 Aug 2018 07:00 )             36.3       08-15    141  |  105  |  20  ----------------------------<  97  3.6   |  28  |  1.10    Ca    9.2      15 Aug 2018 07:00    TPro  7.2  /  Alb  3.5  /  TBili  0.3  /  DBili  x   /  AST  26  /  ALT  22  /  AlkPhos  62  08-14     eGFR if Non African American: 44 mL/min/1.73M2 (08-15-18 @ 07:00)  eGFR if African American: 52 mL/min/1.73M2 (08-15-18 @ 07:00)    PT/INR - ( 14 Aug 2018 12:28 )   PT: 11.5 sec;   INR: 1.04 ratio       PTT - ( 14 Aug 2018 12:28 )  PTT:34.9 sec     CARDIAC MARKERS ( 14 Aug 2018 12:28 )  <.017 ng/mL / x     / x     / x     / x        - Chol 213 mg/dL  mg/dL HDL 71 mg/dL Trig 112 mg/dL  TSH 3.09   TSH with FT4 reflex --  Total T3 --    CAPILLARY BLOOD GLUCOSE    Urinalysis Basic - ( 14 Aug 2018 13:00 )    Color: Yellow / Appearance: Clear / S.015 / pH: x  Gluc: x / Ketone: Negative  / Bili: Negative / Urobili: Negative   Blood: x / Protein: Negative / Nitrite: Negative   Leuk Esterase: Negative / RBC: x / WBC x   Sq Epi: x / Non Sq Epi: x / Bacteria: x    RADIOLOGY & ADDITIONAL TESTS:    Care Discussed with Consultants/Other Providers: YES

## 2018-08-16 NOTE — DISCHARGE NOTE ADULT - SECONDARY DIAGNOSIS.
Hypertension, unspecified type Gastroesophageal reflux disease without esophagitis Pure hyperglyceridemia

## 2018-08-16 NOTE — DISCHARGE NOTE ADULT - CARE PLAN
Principal Discharge DX:	Transient cerebral ischemia, unspecified type  Goal:	Prevent stroke  Assessment and plan of treatment:	continue aspirin 162 mg daily  START atorvastatin 40 mg daily  Secondary Diagnosis:	Hypertension, unspecified type  Secondary Diagnosis:	Gastroesophageal reflux disease without esophagitis  Secondary Diagnosis:	Pure hyperglyceridemia

## 2018-08-16 NOTE — DISCHARGE NOTE ADULT - PATIENT PORTAL LINK FT
You can access the [facility] Portal, offered by [facility], by registering with the following website: [email or web address]

## 2018-08-16 NOTE — DISCHARGE NOTE ADULT - MEDICATION SUMMARY - MEDICATIONS TO TAKE
I will START or STAY ON the medications listed below when I get home from the hospital:    aspirin 162 mg oral delayed release tablet  -- orally once a day  -- Indication: For Cerebrovascular accident (CVA), unspecified mechanism    Cartia  mg/24 hours oral capsule, extended release  -- 1 cap(s) by mouth once a day  -- Indication: For Essential hypertension    atorvastatin 40 mg oral tablet  -- 1 tab(s) by mouth once a day (at bedtime)  -- Indication: For Pure hyperglyceridemia    AMLODIPINE-BENAZEPRIL 5-10 MG  -- 1  by mouth once a day  -- Indication: For Hypertension, unspecified type    dicyclomine 10 mg oral capsule  -- orally 2 times a day  -- Indication: For GI    Fish Oil 1000 mg oral capsule  -- 1 cap(s) by mouth once a day  -- Indication: For Supplement    Timoptic-XE 0.5% ophthalmic gel forming solution  -- 1 drop(s) to each affected eye once a day (at bedtime)  -- Indication: For Eyes    dorzolamide-timolol 2.23%-0.68% ophthalmic solution  -- 1 drop(s) to each affected eye 2 times a day  -- Indication: For Eyes    OMEPRAZOLE DR 20 MG CAPSULE  -- 1  by mouth once a day (at bedtime)  -- Indication: For Reflux    Ocuvite oral tablet  -- 1 tab(s) by mouth once a day  -- Indication: For Eyes    multivitamin  -- 1 tab(s) by mouth once a day  -- Indication: For Supplement    Vitamin B1 100 mg oral tablet  -- 1 tab(s) by mouth once a day  -- Indication: For Supplement    Vitamin B12 500 mcg oral tablet  -- 1 tab(s) by mouth once a day  -- Indication: For Supplement    Vitamin C 500 mg oral tablet  -- 1 tab(s) by mouth once a day  -- Indication: For Supplement

## 2018-08-16 NOTE — DISCHARGE NOTE ADULT - HOSPITAL COURSE
89W PMH HTN presents for slurred speech, initial CT head negative, repeat CT head negative.  Patient with presumed TIA.  Patient started on statin medication and told to follow up with her PMD.  Patient also to have PT at home.

## 2018-08-16 NOTE — PROGRESS NOTE ADULT - ASSESSMENT
88 y/o F with HTN, HLD, arthritis, glaucoma presents with unsteady gait of acute onset, and slurred speech (per daughter).    Repeat CT head negative.    carotids unremarkable    anticipate discharge to home with PT.

## 2018-08-17 ENCOUNTER — TRANSCRIPTION ENCOUNTER (OUTPATIENT)
Age: 83
End: 2018-08-17

## 2018-09-07 ENCOUNTER — MEDICATION RENEWAL (OUTPATIENT)
Age: 83
End: 2018-09-07

## 2018-09-07 ENCOUNTER — APPOINTMENT (OUTPATIENT)
Dept: CARDIOLOGY | Facility: CLINIC | Age: 83
End: 2018-09-07
Payer: MEDICARE

## 2018-09-07 ENCOUNTER — NON-APPOINTMENT (OUTPATIENT)
Age: 83
End: 2018-09-07

## 2018-09-07 VITALS
HEIGHT: 64 IN | SYSTOLIC BLOOD PRESSURE: 148 MMHG | OXYGEN SATURATION: 98 % | BODY MASS INDEX: 25.44 KG/M2 | WEIGHT: 149 LBS | RESPIRATION RATE: 17 BRPM | DIASTOLIC BLOOD PRESSURE: 80 MMHG | HEART RATE: 68 BPM

## 2018-09-07 PROCEDURE — 99215 OFFICE O/P EST HI 40 MIN: CPT

## 2018-09-07 PROCEDURE — 93000 ELECTROCARDIOGRAM COMPLETE: CPT

## 2018-10-01 ENCOUNTER — RX RENEWAL (OUTPATIENT)
Age: 83
End: 2018-10-01

## 2018-10-23 ENCOUNTER — NON-APPOINTMENT (OUTPATIENT)
Age: 83
End: 2018-10-23

## 2018-10-23 ENCOUNTER — APPOINTMENT (OUTPATIENT)
Dept: CARDIOLOGY | Facility: CLINIC | Age: 83
End: 2018-10-23
Payer: MEDICARE

## 2018-10-23 VITALS
SYSTOLIC BLOOD PRESSURE: 160 MMHG | DIASTOLIC BLOOD PRESSURE: 82 MMHG | HEIGHT: 64 IN | WEIGHT: 150 LBS | BODY MASS INDEX: 25.61 KG/M2 | RESPIRATION RATE: 17 BRPM | HEART RATE: 60 BPM | OXYGEN SATURATION: 99 %

## 2018-10-23 PROCEDURE — 93000 ELECTROCARDIOGRAM COMPLETE: CPT

## 2018-10-23 PROCEDURE — 99214 OFFICE O/P EST MOD 30 MIN: CPT

## 2018-10-23 RX ORDER — ASPIRIN 81 MG/1
81 TABLET, CHEWABLE ORAL
Refills: 0 | Status: DISCONTINUED | COMMUNITY
End: 2018-10-23

## 2019-01-25 ENCOUNTER — MEDICATION RENEWAL (OUTPATIENT)
Age: 84
End: 2019-01-25

## 2019-03-23 ENCOUNTER — TRANSCRIPTION ENCOUNTER (OUTPATIENT)
Age: 84
End: 2019-03-23

## 2019-04-08 ENCOUNTER — APPOINTMENT (OUTPATIENT)
Dept: SURGERY | Facility: CLINIC | Age: 84
End: 2019-04-08
Payer: MEDICARE

## 2019-04-08 DIAGNOSIS — Z82.3 FAMILY HISTORY OF STROKE: ICD-10-CM

## 2019-04-08 DIAGNOSIS — Z80.0 FAMILY HISTORY OF MALIGNANT NEOPLASM OF DIGESTIVE ORGANS: ICD-10-CM

## 2019-04-08 PROCEDURE — 99214 OFFICE O/P EST MOD 30 MIN: CPT

## 2019-04-08 RX ORDER — CICLOPIROX 10 MG/.96ML
1 SHAMPOO TOPICAL
Qty: 120 | Refills: 0 | Status: ACTIVE | COMMUNITY
Start: 2018-10-25

## 2019-04-09 NOTE — PHYSICAL EXAM
[Normal Breath Sounds] : Normal breath sounds [Normal Heart Sounds] : normal heart sounds [Normal Rate and Rhythm] : normal rate and rhythm [Abdominal Masses] : No abdominal masses [Abdomen Tenderness] : ~T ~M Abdominal tenderness [de-identified] : nl [de-identified] : nl [de-identified] : reducible ventral hernia

## 2019-04-09 NOTE — HISTORY OF PRESENT ILLNESS
[de-identified] : The patient was supposed to visit the office one year ago in order to monitor her midline incision (right colectomy - 12/14/17). I had told her that her abdominal wall tissue was very thin and a hernia could develop in the future. Now, she has a bulge in the upper abdomen.

## 2019-04-09 NOTE — ASSESSMENT
[FreeTextEntry1] : Long discussion regarding all options and risks\par To return in three months to monitor the ventral hernia

## 2019-04-09 NOTE — REVIEW OF SYSTEMS
[Heart Rate Is Slow] : the heart rate was not slow [Chest Pain] : no chest pain [Shortness Of Breath] : shortness of breath [Abdominal Pain] : abdominal pain [Negative] : Eyes

## 2019-04-26 ENCOUNTER — NON-APPOINTMENT (OUTPATIENT)
Age: 84
End: 2019-04-26

## 2019-04-26 ENCOUNTER — APPOINTMENT (OUTPATIENT)
Dept: CARDIOLOGY | Facility: CLINIC | Age: 84
End: 2019-04-26
Payer: MEDICARE

## 2019-04-26 VITALS
HEIGHT: 64 IN | BODY MASS INDEX: 25.95 KG/M2 | OXYGEN SATURATION: 96 % | WEIGHT: 152 LBS | RESPIRATION RATE: 17 BRPM | HEART RATE: 69 BPM | DIASTOLIC BLOOD PRESSURE: 77 MMHG | SYSTOLIC BLOOD PRESSURE: 144 MMHG

## 2019-04-26 DIAGNOSIS — K21.9 GASTRO-ESOPHAGEAL REFLUX DISEASE W/OUT ESOPHAGITIS: ICD-10-CM

## 2019-04-26 PROCEDURE — 99215 OFFICE O/P EST HI 40 MIN: CPT

## 2019-04-26 PROCEDURE — 93000 ELECTROCARDIOGRAM COMPLETE: CPT

## 2019-05-30 ENCOUNTER — APPOINTMENT (OUTPATIENT)
Dept: MAMMOGRAPHY | Facility: HOSPITAL | Age: 84
End: 2019-05-30
Payer: MEDICARE

## 2019-05-30 ENCOUNTER — OUTPATIENT (OUTPATIENT)
Dept: OUTPATIENT SERVICES | Facility: HOSPITAL | Age: 84
LOS: 1 days | End: 2019-05-30
Payer: MEDICARE

## 2019-05-30 DIAGNOSIS — Z90.49 ACQUIRED ABSENCE OF OTHER SPECIFIED PARTS OF DIGESTIVE TRACT: Chronic | ICD-10-CM

## 2019-05-30 DIAGNOSIS — Z98.89 OTHER SPECIFIED POSTPROCEDURAL STATES: Chronic | ICD-10-CM

## 2019-05-30 DIAGNOSIS — Z90.710 ACQUIRED ABSENCE OF BOTH CERVIX AND UTERUS: Chronic | ICD-10-CM

## 2019-05-30 DIAGNOSIS — Z90.89 ACQUIRED ABSENCE OF OTHER ORGANS: Chronic | ICD-10-CM

## 2019-05-30 DIAGNOSIS — Z00.8 ENCOUNTER FOR OTHER GENERAL EXAMINATION: ICD-10-CM

## 2019-05-30 PROCEDURE — 77063 BREAST TOMOSYNTHESIS BI: CPT

## 2019-05-30 PROCEDURE — 77067 SCR MAMMO BI INCL CAD: CPT

## 2019-05-30 PROCEDURE — 77063 BREAST TOMOSYNTHESIS BI: CPT | Mod: 26

## 2019-05-30 PROCEDURE — 77067 SCR MAMMO BI INCL CAD: CPT | Mod: 26

## 2019-09-21 ENCOUNTER — EMERGENCY (EMERGENCY)
Facility: HOSPITAL | Age: 84
LOS: 1 days | Discharge: HOME | End: 2019-09-21
Attending: EMERGENCY MEDICINE | Admitting: EMERGENCY MEDICINE
Payer: MEDICARE

## 2019-09-21 VITALS
SYSTOLIC BLOOD PRESSURE: 172 MMHG | WEIGHT: 149.91 LBS | TEMPERATURE: 98 F | OXYGEN SATURATION: 97 % | RESPIRATION RATE: 17 BRPM | HEART RATE: 70 BPM | HEIGHT: 65 IN | DIASTOLIC BLOOD PRESSURE: 81 MMHG

## 2019-09-21 VITALS
OXYGEN SATURATION: 97 % | HEART RATE: 71 BPM | RESPIRATION RATE: 16 BRPM | SYSTOLIC BLOOD PRESSURE: 152 MMHG | DIASTOLIC BLOOD PRESSURE: 73 MMHG

## 2019-09-21 DIAGNOSIS — Z90.49 ACQUIRED ABSENCE OF OTHER SPECIFIED PARTS OF DIGESTIVE TRACT: Chronic | ICD-10-CM

## 2019-09-21 DIAGNOSIS — Z90.710 ACQUIRED ABSENCE OF BOTH CERVIX AND UTERUS: Chronic | ICD-10-CM

## 2019-09-21 DIAGNOSIS — Z90.89 ACQUIRED ABSENCE OF OTHER ORGANS: Chronic | ICD-10-CM

## 2019-09-21 DIAGNOSIS — Z98.89 OTHER SPECIFIED POSTPROCEDURAL STATES: Chronic | ICD-10-CM

## 2019-09-21 LAB
APPEARANCE UR: ABNORMAL
BILIRUB UR-MCNC: NEGATIVE — SIGNIFICANT CHANGE UP
COLOR SPEC: YELLOW — SIGNIFICANT CHANGE UP
DIFF PNL FLD: ABNORMAL
GLUCOSE UR QL: NEGATIVE — SIGNIFICANT CHANGE UP
KETONES UR-MCNC: NEGATIVE — SIGNIFICANT CHANGE UP
LEUKOCYTE ESTERASE UR-ACNC: ABNORMAL
NITRITE UR-MCNC: POSITIVE
PH UR: 7 — SIGNIFICANT CHANGE UP (ref 5–8)
PROT UR-MCNC: NEGATIVE — SIGNIFICANT CHANGE UP
SP GR SPEC: 1 — LOW (ref 1.01–1.02)
UROBILINOGEN FLD QL: NEGATIVE — SIGNIFICANT CHANGE UP

## 2019-09-21 PROCEDURE — 81001 URINALYSIS AUTO W/SCOPE: CPT

## 2019-09-21 PROCEDURE — 99283 EMERGENCY DEPT VISIT LOW MDM: CPT

## 2019-09-21 PROCEDURE — 87086 URINE CULTURE/COLONY COUNT: CPT

## 2019-09-21 RX ORDER — CEPHALEXIN 500 MG
500 CAPSULE ORAL
Refills: 0 | Status: DISCONTINUED | OUTPATIENT
Start: 2019-09-21 | End: 2019-10-04

## 2019-09-21 RX ORDER — CEPHALEXIN 500 MG
1 CAPSULE ORAL
Qty: 10 | Refills: 0
Start: 2019-09-21 | End: 2019-09-25

## 2019-09-21 RX ORDER — DORZOLAMIDE HYDROCHLORIDE TIMOLOL MALEATE 20; 5 MG/ML; MG/ML
1 SOLUTION/ DROPS OPHTHALMIC
Qty: 0 | Refills: 0 | DISCHARGE

## 2019-09-21 RX ADMIN — Medication 500 MILLIGRAM(S): at 18:44

## 2019-09-21 NOTE — ED ADULT TRIAGE NOTE - CHIEF COMPLAINT QUOTE
I have had urinary frequency and burning since Wednesday, I took Pyridium and cranberry juice and it got better but has now returned

## 2019-09-21 NOTE — ED PROVIDER NOTE - OBJECTIVE STATEMENT
89 y/o F with h/o HTN, MVP, Arthritis pw dysuria, increased frequency or urination and urgency x 3 days. Denies fever, chills, abdominal pain, back pain, nausea, vomiting. Appears well. Accompanied with isaac BUSBY  No smoking hx

## 2019-09-21 NOTE — ED PROVIDER NOTE - PATIENT PORTAL LINK FT
You can access the FollowMyHealth Patient Portal offered by [facility] by registering at the following website: [email or web address]. By joining [employer]’s FollowMyHealth portal, you will also be able to view your health information using other applications (apps) compatible with our system.

## 2019-09-21 NOTE — ED PROVIDER NOTE - ATTENDING CONTRIBUTION TO CARE
Dr. Zhou: I performed a face to face bedside interview with patient regarding history of present illness, review of symptoms and past medical history. I completed an independent physical exam.  I have discussed patient's plan of care with PA.   I agree with note as stated above, having amended the EMR as needed to reflect my findings.   This includes HISTORY OF PRESENT ILLNESS, HIV, PAST MEDICAL/SURGICAL/FAMILY/SOCIAL HISTORY, ALLERGIES AND HOME MEDICATIONS, REVIEW OF SYSTEMS, PHYSICAL EXAM, and any PROGRESS NOTES during the time I functioned as the attending physician for this patient.     see mdm

## 2019-09-21 NOTE — ED PROVIDER NOTE - NSFOLLOWUPINSTRUCTIONS_ED_ALL_ED_FT
1. Keflex 500mg twice a day for 5 days  2. Follow up with your doctor in 1-2 days  3. Tylenol for pain   4. Return to the ED for fevers, chills, nausea, vomiting, back pain or worsening symptoms  *****    Urinary Tract Infection    A urinary tract infection (UTI) is an infection of any part of the urinary tract, which includes the kidneys, ureters, bladder, and urethra. Risk factors include ignoring your need to urinate, wiping back to front if female, being an uncircumcised male, and having diabetes or a weak immune system. Symptoms include frequent urination, pain or burning with urination, foul smelling urine, cloudy urine, pain in the lower abdomen, blood in the urine, and fever. If you were prescribed an antibiotic medicine, take it as told by your health care provider. Do not stop taking the antibiotic even if you start to feel better.    SEEK IMMEDIATE MEDICAL CARE IF YOU HAVE ANY OF THE FOLLOWING SYMPTOMS: severe back or abdominal pain, fever, inability to keep fluids or medicine down, dizziness/lightheadedness, or a change in mental status.

## 2019-09-21 NOTE — ED ADULT NURSE NOTE - NSIMPLEMENTINTERV_GEN_ALL_ED
Implemented All Fall with Harm Risk Interventions:  [place] to call system. Call bell, personal items and telephone within reach. Instruct patient to call for assistance. Room bathroom lighting operational. Non-slip footwear when patient is off stretcher. Physically safe environment: no spills, clutter or unnecessary equipment. Stretcher in lowest position, wheels locked, appropriate side rails in place. Provide visual cue, wrist band, yellow gown, etc. Monitor gait and stability. Monitor for mental status changes and reorient to person, place, and time. Review medications for side effects contributing to fall risk. Reinforce activity limits and safety measures with patient and family. Provide visual clues: red socks.

## 2019-09-21 NOTE — ED PROVIDER NOTE - CLINICAL SUMMARY MEDICAL DECISION MAKING FREE TEXT BOX
Dr. Zhou: 89F retired RN h/o HTN, MVP, arthritis, frequent UTIs self treats with pyridium p/w 3 days of dysuria and urgency. Has not used abx for UTI. Sx did not improve with pyridium. No fevers, chills, nausea, vomiting, abdo pain, hematuria. On exam pt is very well appearing, nad, rrr, ctab, abdo soft/nt/nd, no CVAT. Will tx with keflex for UTI.

## 2019-09-22 LAB
CULTURE RESULTS: SIGNIFICANT CHANGE UP
SPECIMEN SOURCE: SIGNIFICANT CHANGE UP

## 2019-09-28 DIAGNOSIS — R30.0 DYSURIA: ICD-10-CM

## 2019-10-18 ENCOUNTER — APPOINTMENT (OUTPATIENT)
Dept: CARDIOLOGY | Facility: CLINIC | Age: 84
End: 2019-10-18
Payer: MEDICARE

## 2019-10-18 ENCOUNTER — NON-APPOINTMENT (OUTPATIENT)
Age: 84
End: 2019-10-18

## 2019-10-18 VITALS
DIASTOLIC BLOOD PRESSURE: 83 MMHG | RESPIRATION RATE: 17 BRPM | OXYGEN SATURATION: 96 % | HEART RATE: 63 BPM | BODY MASS INDEX: 25.61 KG/M2 | WEIGHT: 150 LBS | HEIGHT: 64 IN | SYSTOLIC BLOOD PRESSURE: 164 MMHG

## 2019-10-18 PROCEDURE — 93306 TTE W/DOPPLER COMPLETE: CPT

## 2019-10-18 PROCEDURE — G0008: CPT

## 2019-10-18 PROCEDURE — 99215 OFFICE O/P EST HI 40 MIN: CPT | Mod: 25

## 2019-10-18 PROCEDURE — 90662 IIV NO PRSV INCREASED AG IM: CPT

## 2019-10-18 PROCEDURE — 93000 ELECTROCARDIOGRAM COMPLETE: CPT

## 2019-10-18 RX ORDER — AMOXICILLIN 500 MG/1
500 CAPSULE ORAL
Qty: 16 | Refills: 0 | Status: DISCONTINUED | COMMUNITY
Start: 2017-09-05 | End: 2019-10-18

## 2019-10-18 RX ORDER — AZITHROMYCIN 250 MG/1
250 TABLET, FILM COATED ORAL
Qty: 6 | Refills: 0 | Status: DISCONTINUED | COMMUNITY
Start: 2017-11-02 | End: 2019-10-18

## 2019-10-18 RX ORDER — CIPROFLOXACIN HYDROCHLORIDE 500 MG/1
500 TABLET, FILM COATED ORAL
Qty: 10 | Refills: 0 | Status: DISCONTINUED | COMMUNITY
Start: 2017-10-02 | End: 2019-10-18

## 2019-10-18 RX ORDER — ATORVASTATIN CALCIUM 40 MG/1
40 TABLET, FILM COATED ORAL DAILY
Qty: 90 | Refills: 1 | Status: DISCONTINUED | COMMUNITY
Start: 2018-08-17 | End: 2019-10-18

## 2020-01-08 NOTE — ED ADULT NURSE NOTE - NS_NURSE_DISC_TEACHING_YN_ED_ALL_ED
Yes

## 2020-02-03 ENCOUNTER — EMERGENCY (EMERGENCY)
Facility: HOSPITAL | Age: 85
LOS: 1 days | Discharge: HOME | End: 2020-02-03
Attending: EMERGENCY MEDICINE | Admitting: EMERGENCY MEDICINE
Payer: MEDICARE

## 2020-02-03 VITALS
WEIGHT: 149.91 LBS | SYSTOLIC BLOOD PRESSURE: 177 MMHG | OXYGEN SATURATION: 97 % | TEMPERATURE: 97 F | DIASTOLIC BLOOD PRESSURE: 97 MMHG | HEIGHT: 64 IN | RESPIRATION RATE: 16 BRPM | HEART RATE: 69 BPM

## 2020-02-03 DIAGNOSIS — Z98.89 OTHER SPECIFIED POSTPROCEDURAL STATES: Chronic | ICD-10-CM

## 2020-02-03 DIAGNOSIS — Z90.49 ACQUIRED ABSENCE OF OTHER SPECIFIED PARTS OF DIGESTIVE TRACT: Chronic | ICD-10-CM

## 2020-02-03 DIAGNOSIS — Z90.710 ACQUIRED ABSENCE OF BOTH CERVIX AND UTERUS: Chronic | ICD-10-CM

## 2020-02-03 DIAGNOSIS — Z90.89 ACQUIRED ABSENCE OF OTHER ORGANS: Chronic | ICD-10-CM

## 2020-02-03 PROCEDURE — 73630 X-RAY EXAM OF FOOT: CPT | Mod: 26,RT

## 2020-02-03 PROCEDURE — 99283 EMERGENCY DEPT VISIT LOW MDM: CPT

## 2020-02-03 PROCEDURE — 73630 X-RAY EXAM OF FOOT: CPT

## 2020-02-03 RX ORDER — CEPHALEXIN 500 MG
500 CAPSULE ORAL ONCE
Refills: 0 | Status: COMPLETED | OUTPATIENT
Start: 2020-02-03 | End: 2020-02-03

## 2020-02-03 RX ORDER — ASPIRIN/CALCIUM CARB/MAGNESIUM 324 MG
0 TABLET ORAL
Qty: 0 | Refills: 0 | DISCHARGE

## 2020-02-03 RX ORDER — CEPHALEXIN 500 MG
1 CAPSULE ORAL
Qty: 21 | Refills: 0
Start: 2020-02-03 | End: 2020-02-09

## 2020-02-03 RX ORDER — DILTIAZEM HCL 120 MG
1 CAPSULE, EXT RELEASE 24 HR ORAL
Qty: 0 | Refills: 0 | DISCHARGE

## 2020-02-03 RX ADMIN — Medication 500 MILLIGRAM(S): at 09:23

## 2020-02-03 NOTE — ED PROVIDER NOTE - LOWER EXTREMITY EXAM, RIGHT
warmth, erythema and redness started at the base of the 5th toe and extending to the dorsum of foot. No tracking up the leg. No calf tenderness. Nail bed intact. No open wounds or drainage noted. Right knee replacement scar noted./SWELLING/JOINT SWELLING/TENDERNESS

## 2020-02-03 NOTE — ED PROVIDER NOTE - CARE PROVIDER_API CALL
Gabriela Luong)  Family Medicine  [address] [place] [address], [address]  [place] [address]  Phone: [phone]  Fax: [phone]  Follow Up Time:

## 2020-02-03 NOTE — ED PROVIDER NOTE - NSFOLLOWUPINSTRUCTIONS_ED_ALL_ED_FT
Cellulitis    Cellulitis is a skin infection caused by bacteria. This condition occurs most often in the arms and lower legs but can occur anywhere over the body. Symptoms include redness, swelling, warm skin, tenderness, and chills/fever. If you were prescribed an antibiotic medicine, take it as told by your health care provider. Do not stop taking the antibiotic even if you start to feel better.    SEEK IMMEDIATE MEDICAL CARE IF YOU HAVE ANY OF THE FOLLOWING SYMPTOMS: worsening fever, red streaks coming from affected area, vomiting or diarrhea, or dizziness/lightheadedness.     Follow up with your primary doctor. Return if the redness is extending up higher than its current position. Be sure to take all of your antibiotic. Tylenol 650mg every 6 hours as needed for pain. Cellulitis    Cellulitis is a skin infection caused by bacteria. This condition occurs most often in the arms and lower legs but can occur anywhere over the body. Symptoms include redness, swelling, warm skin, tenderness, and chills/fever. If you were prescribed an antibiotic medicine, take it as told by your health care provider. Do not stop taking the antibiotic even if you start to feel better.    SEEK IMMEDIATE MEDICAL CARE IF YOU HAVE ANY OF THE FOLLOWING SYMPTOMS: worsening fever, red streaks coming from affected area, vomiting or diarrhea, or dizziness/lightheadedness.     Follow up with your primary doctor. Return if the redness is extending up higher than its current position. Be sure to take all of your antibiotic. Tylenol 650mg every 6 hours as needed for pain. Xray result is normal (preliminary). If the radiologist has a discrepancy report, we will contact you.

## 2020-02-03 NOTE — ED PROVIDER NOTE - CLINICAL SUMMARY MEDICAL DECISION MAKING FREE TEXT BOX
89F with PMH Afib on eliquis and cardizem presents with atraumatic right foot pain and redness for 1 week. Just prior, she saw the podiatrist for nail clippings only. She also notes that we had snow and she wore a snow boot that may have squeezed her in this area. The pain is mostly at the base of the 5th toe with redness. The redness has spread to the dorsum of the foot. No chills or fever. Mild swelling is noted. No calf pain, knee pain, or groin pain. warmth, erythema and redness started at the base of the 5th toe and extending to the dorsum of foot. No tracking up the leg. No calf tenderness. Nail bed intact. No open wounds or drainage noted. Right knee replacement scar noted.

## 2020-02-03 NOTE — ED PROVIDER NOTE - NS ED MD DISPO DISCHARGE CCDA
Last Appt:  12/15/16    RX sent to pharmacy per standing orders.     Patient/Caregiver provided printed discharge information.

## 2020-02-03 NOTE — ED ADULT NURSE NOTE - OBJECTIVE STATEMENT
89 yr old female ambulatory from home with son for evaluation of pain to right 5th toe. Pt c/o redness/pain. Pt denies fever/chills/fall/trauma. + strong pulses, + sensation, skin warm/red. Right leg elevated. Pt denies any other complaints.

## 2020-02-03 NOTE — ED PROVIDER NOTE - OBJECTIVE STATEMENT
89F with PMH Afib on eliquis and cardizem presents with atraumatic right foot pain and redness for 1 week. Just prior, she saw the podiatrist for nail clippings only. She also notes that we had snow and she wore a snow boot that may have squeezed her in this area. The pain is mostly at the base of the 5th toe with redness. The redness has spread to the dorsum of the foot. No chills or fever. Mild swelling is noted. No calf pain, knee pain, or groin pain.

## 2020-02-03 NOTE — ED PROVIDER NOTE - PATIENT PORTAL LINK FT
You can access the FollowMyHealth Patient Portal offered by [facility] by registering at the following website: [email or web address]. By joining [employer]’s FollowMyHealth portal, you will also be able to view your health information using other applications (apps) compatible with our system.

## 2020-02-25 ENCOUNTER — APPOINTMENT (OUTPATIENT)
Dept: CARDIOLOGY | Facility: CLINIC | Age: 85
End: 2020-02-25
Payer: MEDICARE

## 2020-02-25 ENCOUNTER — NON-APPOINTMENT (OUTPATIENT)
Age: 85
End: 2020-02-25

## 2020-02-25 VITALS
RESPIRATION RATE: 17 BRPM | HEIGHT: 64 IN | SYSTOLIC BLOOD PRESSURE: 157 MMHG | WEIGHT: 166 LBS | DIASTOLIC BLOOD PRESSURE: 77 MMHG | OXYGEN SATURATION: 97 % | HEART RATE: 67 BPM | BODY MASS INDEX: 28.34 KG/M2

## 2020-02-25 DIAGNOSIS — R06.2 WHEEZING: ICD-10-CM

## 2020-02-25 PROCEDURE — 93000 ELECTROCARDIOGRAM COMPLETE: CPT

## 2020-02-25 PROCEDURE — 99214 OFFICE O/P EST MOD 30 MIN: CPT

## 2020-02-25 NOTE — REASON FOR VISIT
[FreeTextEntry1] : Cardiology followup visit for evaluation and management of the paroxysmal atrial fibrillation, anticoagulated, hypertension, hyperlipidemia, history of TIA. Recent wheezing.\par \par

## 2020-02-25 NOTE — HISTORY OF PRESENT ILLNESS
[FreeTextEntry1] : She is accompanied today to the office by her daughter.\par Since her last visit with me, she has been feeling mostly well. However, for the past few weeks, her daughter has been noting that she has been having intermittent wheezing. There is no associated cough. She feels as though she might become short of breath after walking a moderate distance. No chest pain or chest pressure. Her resting dyspnea. Denies medications. No dizziness or lightheadedness. No syncope. No edema, no orthopnea.\par Remains on anticoagulation. Denies any excessive ecchymosis, bleeding, black or bloody stools, hematuria or epistaxis.\par \par

## 2020-02-25 NOTE — DISCUSSION/SUMMARY
[FreeTextEntry1] : 89-year-old woman with paroxysmal atrial fibrillation, anticoagulated, hypertension, hyperlipidemia, previous history of TIA.\par She does report intermittent wheezing, as noted above.\par The following is recommended.\par \par Plan\par 1. Chest x-ray is ordered to rule out infiltrate.\par 2. Start Advair due to wheezing.\par 3. No other changes in medications.\par 4. Follow up with me in the office in 4 months.\par 5. She'll continue follow up with primary physician (Dr. Luong)  for overall medical care.\par 6. Cardiac issues were discussed with the patient and with her daughter Felipa, all questions answered.\par

## 2020-02-25 NOTE — PHYSICAL EXAM
[Normal Appearance] : normal appearance [General Appearance - Well Developed] : well developed [Well Groomed] : well groomed [General Appearance - Well Nourished] : well nourished [No Deformities] : no deformities [General Appearance - In No Acute Distress] : no acute distress [Normal Conjunctiva] : the conjunctiva exhibited no abnormalities [Normal Oropharynx] : normal oropharynx [No Oral Pallor] : no oral pallor [Normal Jugular Venous V Waves Present] : normal jugular venous V waves present [Respiration, Rhythm And Depth] : normal respiratory rhythm and effort [Auscultation Breath Sounds / Voice Sounds] : lungs were clear to auscultation bilaterally [Normal Rate] : normal [No Precordial Heave] : no precordial heave was noted [Normal S1] : normal S1 [Normal S2] : normal S2 [No Gallop] : no gallop heard [II] : a grade 2 [2+] : Carotid: right 2+ [No Pitting Edema] : no pitting edema present [Abdomen Soft] : soft [Abdomen Tenderness] : non-tender [Abnormal Walk] : normal gait [Cyanosis, Localized] : no localized cyanosis [Nail Clubbing] : no clubbing of the fingernails [Oriented To Time, Place, And Person] : oriented to person, place, and time [Skin Color & Pigmentation] : normal skin color and pigmentation [] : no rash [No Anxiety] : not feeling anxious [Click] : no click [S3] : no S3 [S4] : no S4 [Pericardial Rub] : no pericardial rub [Right Carotid Bruit] : no bruit heard over the right carotid [Left Carotid Bruit] : no bruit heard over the left carotid

## 2020-02-26 ENCOUNTER — TRANSCRIPTION ENCOUNTER (OUTPATIENT)
Age: 85
End: 2020-02-26

## 2020-03-02 ENCOUNTER — FORM ENCOUNTER (OUTPATIENT)
Age: 85
End: 2020-03-02

## 2020-03-03 ENCOUNTER — OUTPATIENT (OUTPATIENT)
Dept: OUTPATIENT SERVICES | Facility: HOSPITAL | Age: 85
LOS: 1 days | End: 2020-03-03
Payer: MEDICARE

## 2020-03-03 ENCOUNTER — APPOINTMENT (OUTPATIENT)
Dept: RADIOLOGY | Facility: HOSPITAL | Age: 85
End: 2020-03-03
Payer: MEDICARE

## 2020-03-03 DIAGNOSIS — Z90.89 ACQUIRED ABSENCE OF OTHER ORGANS: Chronic | ICD-10-CM

## 2020-03-03 DIAGNOSIS — Z98.89 OTHER SPECIFIED POSTPROCEDURAL STATES: Chronic | ICD-10-CM

## 2020-03-03 DIAGNOSIS — Z90.710 ACQUIRED ABSENCE OF BOTH CERVIX AND UTERUS: Chronic | ICD-10-CM

## 2020-03-03 DIAGNOSIS — Z90.49 ACQUIRED ABSENCE OF OTHER SPECIFIED PARTS OF DIGESTIVE TRACT: Chronic | ICD-10-CM

## 2020-03-03 DIAGNOSIS — R06.2 WHEEZING: ICD-10-CM

## 2020-03-03 PROCEDURE — 71046 X-RAY EXAM CHEST 2 VIEWS: CPT | Mod: 26

## 2020-03-03 PROCEDURE — 71046 X-RAY EXAM CHEST 2 VIEWS: CPT

## 2020-04-15 ENCOUNTER — RX RENEWAL (OUTPATIENT)
Age: 85
End: 2020-04-15

## 2020-04-28 ENCOUNTER — APPOINTMENT (OUTPATIENT)
Dept: CARDIOLOGY | Facility: CLINIC | Age: 85
End: 2020-04-28

## 2020-06-25 ENCOUNTER — RX RENEWAL (OUTPATIENT)
Age: 85
End: 2020-06-25

## 2020-08-11 ENCOUNTER — APPOINTMENT (OUTPATIENT)
Dept: CARDIOLOGY | Facility: CLINIC | Age: 85
End: 2020-08-11
Payer: MEDICARE

## 2020-08-11 VITALS
TEMPERATURE: 97.7 F | HEIGHT: 64 IN | SYSTOLIC BLOOD PRESSURE: 117 MMHG | RESPIRATION RATE: 18 BRPM | DIASTOLIC BLOOD PRESSURE: 70 MMHG | WEIGHT: 160 LBS | HEART RATE: 98 BPM | BODY MASS INDEX: 27.31 KG/M2 | OXYGEN SATURATION: 93 %

## 2020-08-11 PROCEDURE — 99215 OFFICE O/P EST HI 40 MIN: CPT

## 2020-08-11 PROCEDURE — 93000 ELECTROCARDIOGRAM COMPLETE: CPT

## 2020-12-21 ENCOUNTER — NON-APPOINTMENT (OUTPATIENT)
Age: 85
End: 2020-12-21

## 2020-12-21 ENCOUNTER — APPOINTMENT (OUTPATIENT)
Dept: CARDIOLOGY | Facility: CLINIC | Age: 85
End: 2020-12-21
Payer: MEDICARE

## 2020-12-21 VITALS
BODY MASS INDEX: 26.8 KG/M2 | DIASTOLIC BLOOD PRESSURE: 77 MMHG | HEIGHT: 64 IN | OXYGEN SATURATION: 100 % | RESPIRATION RATE: 18 BRPM | TEMPERATURE: 98.2 F | HEART RATE: 91 BPM | WEIGHT: 157 LBS | SYSTOLIC BLOOD PRESSURE: 162 MMHG

## 2020-12-21 DIAGNOSIS — K43.2 INCISIONAL HERNIA W/OUT OBSTRUCTION OR GANGRENE: ICD-10-CM

## 2020-12-21 PROCEDURE — 93000 ELECTROCARDIOGRAM COMPLETE: CPT

## 2020-12-21 PROCEDURE — 99215 OFFICE O/P EST HI 40 MIN: CPT

## 2021-01-07 ENCOUNTER — TRANSCRIPTION ENCOUNTER (OUTPATIENT)
Age: 86
End: 2021-01-07

## 2021-03-04 ENCOUNTER — RX RENEWAL (OUTPATIENT)
Age: 86
End: 2021-03-04

## 2021-03-22 ENCOUNTER — RX RENEWAL (OUTPATIENT)
Age: 86
End: 2021-03-22

## 2021-04-08 ENCOUNTER — TRANSCRIPTION ENCOUNTER (OUTPATIENT)
Age: 86
End: 2021-04-08

## 2021-04-12 ENCOUNTER — APPOINTMENT (OUTPATIENT)
Dept: CARDIOLOGY | Facility: CLINIC | Age: 86
End: 2021-04-12
Payer: MEDICARE

## 2021-04-12 ENCOUNTER — TRANSCRIPTION ENCOUNTER (OUTPATIENT)
Age: 86
End: 2021-04-12

## 2021-04-12 ENCOUNTER — NON-APPOINTMENT (OUTPATIENT)
Age: 86
End: 2021-04-12

## 2021-04-12 VITALS
HEIGHT: 64 IN | TEMPERATURE: 96.1 F | BODY MASS INDEX: 24.92 KG/M2 | OXYGEN SATURATION: 89 % | WEIGHT: 146 LBS | SYSTOLIC BLOOD PRESSURE: 174 MMHG | RESPIRATION RATE: 18 BRPM | DIASTOLIC BLOOD PRESSURE: 87 MMHG | HEART RATE: 81 BPM

## 2021-04-12 PROCEDURE — 93306 TTE W/DOPPLER COMPLETE: CPT

## 2021-04-12 PROCEDURE — 99214 OFFICE O/P EST MOD 30 MIN: CPT

## 2021-04-12 PROCEDURE — 99072 ADDL SUPL MATRL&STAF TM PHE: CPT

## 2021-04-12 PROCEDURE — 93000 ELECTROCARDIOGRAM COMPLETE: CPT

## 2021-04-12 RX ORDER — FLUTICASONE PROPIONATE AND SALMETEROL 100; 50 UG/1; UG/1
100-50 POWDER RESPIRATORY (INHALATION)
Qty: 3 | Refills: 1 | Status: ACTIVE | COMMUNITY
Start: 2021-04-12 | End: 1900-01-01

## 2021-07-09 ENCOUNTER — RX RENEWAL (OUTPATIENT)
Age: 86
End: 2021-07-09

## 2021-08-27 ENCOUNTER — NON-APPOINTMENT (OUTPATIENT)
Age: 86
End: 2021-08-27

## 2021-08-27 ENCOUNTER — APPOINTMENT (OUTPATIENT)
Dept: CARDIOLOGY | Facility: CLINIC | Age: 86
End: 2021-08-27
Payer: MEDICARE

## 2021-08-27 VITALS
BODY MASS INDEX: 23.56 KG/M2 | RESPIRATION RATE: 18 BRPM | DIASTOLIC BLOOD PRESSURE: 77 MMHG | HEART RATE: 72 BPM | SYSTOLIC BLOOD PRESSURE: 154 MMHG | HEIGHT: 64 IN | TEMPERATURE: 96.1 F | OXYGEN SATURATION: 96 % | WEIGHT: 138 LBS

## 2021-08-27 PROCEDURE — 99215 OFFICE O/P EST HI 40 MIN: CPT

## 2021-08-27 PROCEDURE — 93000 ELECTROCARDIOGRAM COMPLETE: CPT

## 2021-09-02 ENCOUNTER — TRANSCRIPTION ENCOUNTER (OUTPATIENT)
Age: 86
End: 2021-09-02

## 2021-09-20 ENCOUNTER — TRANSCRIPTION ENCOUNTER (OUTPATIENT)
Age: 86
End: 2021-09-20

## 2021-09-20 ENCOUNTER — RX RENEWAL (OUTPATIENT)
Age: 86
End: 2021-09-20

## 2021-09-22 ENCOUNTER — TRANSCRIPTION ENCOUNTER (OUTPATIENT)
Age: 86
End: 2021-09-22

## 2021-09-22 ENCOUNTER — RX RENEWAL (OUTPATIENT)
Age: 86
End: 2021-09-22

## 2021-10-04 ENCOUNTER — LABORATORY RESULT (OUTPATIENT)
Age: 86
End: 2021-10-04

## 2021-10-04 ENCOUNTER — TRANSCRIPTION ENCOUNTER (OUTPATIENT)
Age: 86
End: 2021-10-04

## 2021-10-04 LAB
ALBUMIN SERPL ELPH-MCNC: 4.5 G/DL
ALP BLD-CCNC: 103 U/L
ALT SERPL-CCNC: 46 U/L
ANION GAP SERPL CALC-SCNC: 12 MMOL/L
AST SERPL-CCNC: 43 U/L
BASOPHILS # BLD AUTO: 0.03 K/UL
BASOPHILS NFR BLD AUTO: 0.5 %
BILIRUB SERPL-MCNC: 0.7 MG/DL
BUN SERPL-MCNC: 17 MG/DL
CALCIUM SERPL-MCNC: 9.8 MG/DL
CHLORIDE SERPL-SCNC: 107 MMOL/L
CO2 SERPL-SCNC: 24 MMOL/L
CREAT SERPL-MCNC: 1.07 MG/DL
EOSINOPHIL # BLD AUTO: 0.11 K/UL
EOSINOPHIL NFR BLD AUTO: 1.8 %
GLUCOSE SERPL-MCNC: 89 MG/DL
HCT VFR BLD CALC: 39.3 %
HGB BLD-MCNC: 12.9 G/DL
IMM GRANULOCYTES NFR BLD AUTO: 0.5 %
LYMPHOCYTES # BLD AUTO: 1.01 K/UL
LYMPHOCYTES NFR BLD AUTO: 17 %
MAN DIFF?: NORMAL
MCHC RBC-ENTMCNC: 32.4 PG
MCHC RBC-ENTMCNC: 32.8 GM/DL
MCV RBC AUTO: 98.7 FL
MONOCYTES # BLD AUTO: 0.54 K/UL
MONOCYTES NFR BLD AUTO: 9.1 %
NEUTROPHILS # BLD AUTO: 4.23 K/UL
NEUTROPHILS NFR BLD AUTO: 71.1 %
PLATELET # BLD AUTO: 253 K/UL
POTASSIUM SERPL-SCNC: 4.4 MMOL/L
PROT SERPL-MCNC: 6.7 G/DL
RBC # BLD: 3.98 M/UL
RBC # FLD: 13.9 %
SODIUM SERPL-SCNC: 143 MMOL/L
TSH SERPL-ACNC: 5.34 UIU/ML
WBC # FLD AUTO: 5.95 K/UL

## 2021-12-24 ENCOUNTER — RX RENEWAL (OUTPATIENT)
Age: 86
End: 2021-12-24

## 2022-01-26 ENCOUNTER — APPOINTMENT (OUTPATIENT)
Dept: CARDIOLOGY | Facility: CLINIC | Age: 87
End: 2022-01-26
Payer: MEDICARE

## 2022-01-26 ENCOUNTER — NON-APPOINTMENT (OUTPATIENT)
Age: 87
End: 2022-01-26

## 2022-01-26 VITALS
SYSTOLIC BLOOD PRESSURE: 136 MMHG | RESPIRATION RATE: 16 BRPM | HEART RATE: 73 BPM | HEIGHT: 64 IN | BODY MASS INDEX: 23.56 KG/M2 | WEIGHT: 138 LBS | DIASTOLIC BLOOD PRESSURE: 64 MMHG | OXYGEN SATURATION: 99 %

## 2022-01-26 PROCEDURE — 99215 OFFICE O/P EST HI 40 MIN: CPT

## 2022-01-26 PROCEDURE — 93000 ELECTROCARDIOGRAM COMPLETE: CPT

## 2022-01-26 RX ORDER — ALBUTEROL SULFATE 90 UG/1
108 (90 BASE) INHALANT RESPIRATORY (INHALATION)
Qty: 1 | Refills: 0 | Status: ACTIVE | COMMUNITY
Start: 2017-11-02 | End: 1900-01-01

## 2022-01-26 RX ORDER — AMLODIPINE BESYLATE AND BENAZEPRIL HYDROCHLORIDE 5; 10 MG/1; MG/1
5-10 CAPSULE ORAL
Qty: 90 | Refills: 1 | Status: DISCONTINUED | COMMUNITY
Start: 2017-05-05 | End: 2022-01-26

## 2022-01-28 ENCOUNTER — TRANSCRIPTION ENCOUNTER (OUTPATIENT)
Age: 87
End: 2022-01-28

## 2022-03-07 ENCOUNTER — TRANSCRIPTION ENCOUNTER (OUTPATIENT)
Age: 87
End: 2022-03-07

## 2022-04-25 ENCOUNTER — RX RENEWAL (OUTPATIENT)
Age: 87
End: 2022-04-25

## 2022-05-25 ENCOUNTER — APPOINTMENT (OUTPATIENT)
Dept: CARDIOLOGY | Facility: CLINIC | Age: 87
End: 2022-05-25
Payer: MEDICARE

## 2022-05-25 ENCOUNTER — NON-APPOINTMENT (OUTPATIENT)
Age: 87
End: 2022-05-25

## 2022-05-25 VITALS
HEIGHT: 64 IN | TEMPERATURE: 96.4 F | RESPIRATION RATE: 19 BRPM | OXYGEN SATURATION: 100 % | DIASTOLIC BLOOD PRESSURE: 78 MMHG | BODY MASS INDEX: 23.56 KG/M2 | SYSTOLIC BLOOD PRESSURE: 142 MMHG | WEIGHT: 138 LBS | HEART RATE: 69 BPM

## 2022-05-25 PROCEDURE — 99215 OFFICE O/P EST HI 40 MIN: CPT

## 2022-05-25 PROCEDURE — 93000 ELECTROCARDIOGRAM COMPLETE: CPT

## 2022-05-25 PROCEDURE — 93306 TTE W/DOPPLER COMPLETE: CPT

## 2022-05-25 RX ORDER — FLUTICASONE PROPIONATE AND SALMETEROL 100; 50 UG/1; UG/1
100-50 POWDER RESPIRATORY (INHALATION) TWICE DAILY
Qty: 1 | Refills: 3 | Status: ACTIVE | COMMUNITY
Start: 2022-05-25 | End: 1900-01-01

## 2022-07-06 ENCOUNTER — TRANSCRIPTION ENCOUNTER (OUTPATIENT)
Age: 87
End: 2022-07-06

## 2022-07-25 ENCOUNTER — TRANSCRIPTION ENCOUNTER (OUTPATIENT)
Age: 87
End: 2022-07-25

## 2022-09-07 ENCOUNTER — APPOINTMENT (OUTPATIENT)
Dept: CARDIOLOGY | Facility: CLINIC | Age: 87
End: 2022-09-07

## 2022-09-07 ENCOUNTER — NON-APPOINTMENT (OUTPATIENT)
Age: 87
End: 2022-09-07

## 2022-09-07 VITALS
RESPIRATION RATE: 17 BRPM | OXYGEN SATURATION: 97 % | SYSTOLIC BLOOD PRESSURE: 143 MMHG | HEIGHT: 64 IN | HEART RATE: 73 BPM | WEIGHT: 140 LBS | BODY MASS INDEX: 23.9 KG/M2 | DIASTOLIC BLOOD PRESSURE: 81 MMHG

## 2022-09-07 PROCEDURE — 93000 ELECTROCARDIOGRAM COMPLETE: CPT

## 2022-09-07 PROCEDURE — 99215 OFFICE O/P EST HI 40 MIN: CPT

## 2022-09-07 RX ORDER — DILTIAZEM HYDROCHLORIDE 120 MG/1
120 CAPSULE, EXTENDED RELEASE ORAL
Qty: 1 | Refills: 1 | Status: ACTIVE | COMMUNITY
Start: 2017-02-07 | End: 1900-01-01

## 2022-10-07 NOTE — DISCHARGE NOTE ADULT - ADMISSION DATE +STARTOFVISITDATE
DISPLAY PLAN FREE TEXT
Statement Selected
DISPLAY PLAN FREE TEXT

## 2022-10-27 ENCOUNTER — TRANSCRIPTION ENCOUNTER (OUTPATIENT)
Age: 87
End: 2022-10-27

## 2022-10-28 ENCOUNTER — TRANSCRIPTION ENCOUNTER (OUTPATIENT)
Age: 87
End: 2022-10-28

## 2022-11-28 ENCOUNTER — RX RENEWAL (OUTPATIENT)
Age: 87
End: 2022-11-28

## 2023-01-10 ENCOUNTER — TRANSCRIPTION ENCOUNTER (OUTPATIENT)
Age: 88
End: 2023-01-10

## 2023-01-16 ENCOUNTER — NON-APPOINTMENT (OUTPATIENT)
Age: 88
End: 2023-01-16

## 2023-01-16 ENCOUNTER — APPOINTMENT (OUTPATIENT)
Dept: CARDIOLOGY | Facility: CLINIC | Age: 88
End: 2023-01-16
Payer: MEDICARE

## 2023-01-16 VITALS
BODY MASS INDEX: 23.9 KG/M2 | OXYGEN SATURATION: 97 % | RESPIRATION RATE: 19 BRPM | WEIGHT: 140 LBS | HEART RATE: 76 BPM | HEIGHT: 64 IN | SYSTOLIC BLOOD PRESSURE: 160 MMHG | TEMPERATURE: 96.2 F | DIASTOLIC BLOOD PRESSURE: 80 MMHG

## 2023-01-16 PROCEDURE — 99215 OFFICE O/P EST HI 40 MIN: CPT

## 2023-01-16 PROCEDURE — 93000 ELECTROCARDIOGRAM COMPLETE: CPT

## 2023-01-19 ENCOUNTER — TRANSCRIPTION ENCOUNTER (OUTPATIENT)
Age: 88
End: 2023-01-19

## 2023-03-08 ENCOUNTER — RX RENEWAL (OUTPATIENT)
Age: 88
End: 2023-03-08

## 2023-03-08 RX ORDER — FLUTICASONE PROPIONATE AND SALMETEROL 100; 50 UG/1; UG/1
100-50 POWDER RESPIRATORY (INHALATION)
Qty: 1 | Refills: 1 | Status: ACTIVE | COMMUNITY
Start: 2023-03-08 | End: 1900-01-01

## 2023-04-14 NOTE — PATIENT PROFILE ADULT. - MEDICATION DISPOSITION, PROFILE
Spoke with patient who wants a continuous glucose monitor, I let him know we would fill out the form and get the insurance stuff started for him.   sent home w/ family/friend

## 2023-05-16 ENCOUNTER — APPOINTMENT (OUTPATIENT)
Dept: CARDIOLOGY | Facility: CLINIC | Age: 88
End: 2023-05-16
Payer: MEDICARE

## 2023-05-16 ENCOUNTER — NON-APPOINTMENT (OUTPATIENT)
Age: 88
End: 2023-05-16

## 2023-05-16 VITALS
DIASTOLIC BLOOD PRESSURE: 76 MMHG | HEART RATE: 82 BPM | SYSTOLIC BLOOD PRESSURE: 180 MMHG | BODY MASS INDEX: 22.88 KG/M2 | OXYGEN SATURATION: 98 % | WEIGHT: 134 LBS | RESPIRATION RATE: 19 BRPM | HEIGHT: 64 IN | TEMPERATURE: 92.5 F

## 2023-05-16 PROCEDURE — 99215 OFFICE O/P EST HI 40 MIN: CPT

## 2023-05-16 PROCEDURE — 93000 ELECTROCARDIOGRAM COMPLETE: CPT

## 2023-05-16 NOTE — HISTORY OF PRESENT ILLNESS
[FreeTextEntry1] : She is accompanied today to the office by her son Mariano.\par Reports that she has been at her baseline.  Mostly sedentary lifestyle.  She has home aides that come to her house every day of the week from 9 to 2 PM.  Afterwards she is on her own.  She has unsteady gait but has had no recent falls.  She worries about her skin as she does have diffuse ecchymosis.  She does have occasional.'s of skin bleeding if she pushes up against a wall or if she has an abrasion.\par Denies any black or bloody stools.  No hematuria.  No epistaxis.\par No complaints of chest pain or chest pressure.  No shortness of breath.  No dyspnea on exertion.  Denies palpitations.  No dizziness, lightheadedness, syncope or near syncope.  No edema, no orthopnea.  No PND.\par \par

## 2023-05-16 NOTE — REASON FOR VISIT
[FreeTextEntry1] : Cardiology follow-up visit for evaluation management of persistent atrial fibrillation, anticoagulated, hypertension, hyperlipidemia, prior TIA.\par \par

## 2023-05-16 NOTE — CARDIOLOGY SUMMARY
Remains injury free. Pain managed with IV Morphine. Repositions independently. DOMI drain with thick , creamy yellow drainage. No s/s acute distres. Will monitor.   [de-identified] : May 16 2023. Atrial fibrillation moderate ventricular response \par ABNORMAL RHYTHM [de-identified] : May 25, 2022. Normal LV function. Moderate AS. Mild AR and MR

## 2023-05-16 NOTE — PHYSICAL EXAM
[Frail] : frail [Normal S1, S2] : normal S1, S2 [No Rub] : no rub [No Gallop] : no gallop [Murmur] : murmur [Normal] : alert and oriented, normal memory [de-identified] : ll/Vl systolic

## 2023-05-16 NOTE — DISCUSSION/SUMMARY
[FreeTextEntry1] : 89-year-old woman with persistent atrial fibrillation, anticoagulated, hypertension, hyperlipidemia, prior TIA.\par She has a mostly sedentary lifestyle, asymptomatic with respect to cardiac issues.\par Blood pressure stable.  There is no JVD.  Lung fields are clear.  No edema.  She is euvolemic.\par EKG is atrial fibrillation with a moderate ventricular response.  Nonspecific ST changes.\par The current cardiac status appears to be at its baseline.\par \par Plan\par 1.  Current medication list is reviewed, no changes.\par 2.  Routine blood work is ordered, she states her last blood work was about 1 year ago.\par 3.  Continue with Eliquis at current dosing, she does not appear to meet criteria for reduced dosing currently \par 4.  Follow-up with me in the office in 4 months, echocardiogram.\par 5.  The above was reviewed with the patient and with her son Mariano, all questions have been answered to their satisfaction.\par

## 2023-05-31 LAB
ALBUMIN SERPL ELPH-MCNC: 4.7 G/DL
ALP BLD-CCNC: 101 U/L
ALT SERPL-CCNC: 24 U/L
ANION GAP SERPL CALC-SCNC: 14 MMOL/L
AST SERPL-CCNC: 29 U/L
BILIRUB SERPL-MCNC: 0.7 MG/DL
BUN SERPL-MCNC: 22 MG/DL
CALCIUM SERPL-MCNC: 10 MG/DL
CHLORIDE SERPL-SCNC: 104 MMOL/L
CHOLEST SERPL-MCNC: 146 MG/DL
CO2 SERPL-SCNC: 23 MMOL/L
CREAT SERPL-MCNC: 0.93 MG/DL
EGFR: 57 ML/MIN/1.73M2
GLUCOSE SERPL-MCNC: 89 MG/DL
HCT VFR BLD CALC: 38.8 %
HDLC SERPL-MCNC: 80 MG/DL
HGB BLD-MCNC: 12.7 G/DL
LDLC SERPL CALC-MCNC: 49 MG/DL
MCHC RBC-ENTMCNC: 32.7 GM/DL
MCHC RBC-ENTMCNC: 34 PG
MCV RBC AUTO: 103.7 FL
NONHDLC SERPL-MCNC: 66 MG/DL
PLATELET # BLD AUTO: 229 K/UL
POTASSIUM SERPL-SCNC: 4.1 MMOL/L
PROT SERPL-MCNC: 7.1 G/DL
RBC # BLD: 3.74 M/UL
RBC # FLD: 14.3 %
SODIUM SERPL-SCNC: 141 MMOL/L
TRIGL SERPL-MCNC: 87 MG/DL
TSH SERPL-ACNC: 3.51 UIU/ML
WBC # FLD AUTO: 8.04 K/UL

## 2023-06-01 ENCOUNTER — NON-APPOINTMENT (OUTPATIENT)
Age: 88
End: 2023-06-01

## 2023-06-15 ENCOUNTER — RX RENEWAL (OUTPATIENT)
Age: 88
End: 2023-06-15

## 2023-08-17 ENCOUNTER — RX RENEWAL (OUTPATIENT)
Age: 88
End: 2023-08-17

## 2023-09-13 NOTE — ED PROVIDER NOTE - PRO INTERPRETER NEED 2
English

## 2023-09-19 ENCOUNTER — APPOINTMENT (OUTPATIENT)
Dept: CARDIOLOGY | Facility: CLINIC | Age: 88
End: 2023-09-19
Payer: MEDICARE

## 2023-09-19 ENCOUNTER — NON-APPOINTMENT (OUTPATIENT)
Age: 88
End: 2023-09-19

## 2023-09-19 VITALS
DIASTOLIC BLOOD PRESSURE: 75 MMHG | RESPIRATION RATE: 19 BRPM | HEART RATE: 81 BPM | TEMPERATURE: 94.2 F | BODY MASS INDEX: 22.2 KG/M2 | SYSTOLIC BLOOD PRESSURE: 160 MMHG | OXYGEN SATURATION: 96 % | WEIGHT: 130 LBS | HEIGHT: 64 IN

## 2023-09-19 PROCEDURE — 93306 TTE W/DOPPLER COMPLETE: CPT

## 2023-09-19 PROCEDURE — 93000 ELECTROCARDIOGRAM COMPLETE: CPT

## 2023-09-19 PROCEDURE — 99215 OFFICE O/P EST HI 40 MIN: CPT

## 2023-09-22 ENCOUNTER — RX RENEWAL (OUTPATIENT)
Age: 88
End: 2023-09-22

## 2023-12-08 ENCOUNTER — RX RENEWAL (OUTPATIENT)
Age: 88
End: 2023-12-08

## 2023-12-11 ENCOUNTER — TRANSCRIPTION ENCOUNTER (OUTPATIENT)
Age: 88
End: 2023-12-11

## 2023-12-29 ENCOUNTER — RX RENEWAL (OUTPATIENT)
Age: 88
End: 2023-12-29

## 2024-01-02 ENCOUNTER — TRANSCRIPTION ENCOUNTER (OUTPATIENT)
Age: 89
End: 2024-01-02

## 2024-01-02 RX ORDER — FLUTICASONE PROPIONATE AND SALMETEROL 50; 100 UG/1; UG/1
100-50 POWDER RESPIRATORY (INHALATION) TWICE DAILY
Qty: 1 | Refills: 1 | Status: DISCONTINUED | COMMUNITY
Start: 2020-02-25 | End: 2024-01-02

## 2024-01-02 RX ORDER — FLUTICASONE PROPIONATE AND SALMETEROL 50; 250 UG/1; UG/1
250-50 POWDER RESPIRATORY (INHALATION)
Qty: 13 | Refills: 1 | Status: ACTIVE | COMMUNITY
Start: 2022-05-25 | End: 1900-01-01

## 2024-01-17 ENCOUNTER — NON-APPOINTMENT (OUTPATIENT)
Age: 89
End: 2024-01-17

## 2024-01-17 ENCOUNTER — APPOINTMENT (OUTPATIENT)
Dept: CARDIOLOGY | Facility: CLINIC | Age: 89
End: 2024-01-17
Payer: MEDICARE

## 2024-01-17 VITALS
HEART RATE: 81 BPM | BODY MASS INDEX: 20.66 KG/M2 | HEIGHT: 64 IN | DIASTOLIC BLOOD PRESSURE: 77 MMHG | SYSTOLIC BLOOD PRESSURE: 162 MMHG | OXYGEN SATURATION: 97 % | WEIGHT: 121 LBS

## 2024-01-17 DIAGNOSIS — I35.0 NONRHEUMATIC AORTIC (VALVE) STENOSIS: ICD-10-CM

## 2024-01-17 DIAGNOSIS — G45.9 TRANSIENT CEREBRAL ISCHEMIC ATTACK, UNSPECIFIED: ICD-10-CM

## 2024-01-17 PROCEDURE — 99215 OFFICE O/P EST HI 40 MIN: CPT

## 2024-01-17 PROCEDURE — 93000 ELECTROCARDIOGRAM COMPLETE: CPT

## 2024-01-17 RX ORDER — ALBUTEROL SULFATE 90 UG/1
108 (90 BASE) POWDER, METERED RESPIRATORY (INHALATION)
Qty: 1 | Refills: 1 | Status: ACTIVE | COMMUNITY
Start: 2022-01-28 | End: 1900-01-01

## 2024-01-17 NOTE — HISTORY OF PRESENT ILLNESS
[FreeTextEntry1] : Accompanied today to the office by her son. Since last visit with me, she has been having intermittent coughing fits with associated wheezing.  Symptoms resolved about a week ago and has not recurred since then. She describes cough is being dry, nonproductive.  No fevers. No complaints of chest pain or chest pressure.  No shortness of breath.  Denies palpitations.  No dizziness or lightheadedness.  No syncope.  No edema, orthopnea.  No PND. She has been having trouble with her hearing aids, and this is frustrating her. She does have part-time aide coming to her house but at night she is on her own and part of the weekend also she is on her own.

## 2024-01-17 NOTE — DISCUSSION/SUMMARY
[FreeTextEntry1] : 89-year-old woman with persistent atrial fibrillation, anticoagulated, hypertension, hyperlipidemia, prior history of TIA.  She reports recent cough and wheezing that is now resolved. Blood pressure stable on today's examination.  There is no JVD.  Lung fields are clear.  No edema.  She is euvolemic. EKG is atrial fibrillation.  PVC.  Low voltage.  Plan 1.  Current medications are reviewed.  Patient and her daughter are requesting refill prescription for ProAir which she had previously been taking. 2.  No other changes in medications. 3.  Blood work and chest ray x-ray is ordered. 4.  She will follow-up with me in the office in 4 months, echocardiogram. 5.  The above was reviewed with the patient and her son, all questions answered.

## 2024-01-17 NOTE — CARDIOLOGY SUMMARY
[de-identified] : Jan 17 2024. Atrial fibrillation  ABNORMAL [de-identified] : Sep 19 2023. Normal LV function. Mild AS. Mild AR and MR

## 2024-01-17 NOTE — PHYSICAL EXAM
[Frail] : frail [Normal S1, S2] : normal S1, S2 [No Rub] : no rub [No Gallop] : no gallop [Murmur] : murmur [Normal] : alert and oriented, normal memory [de-identified] : ll/Vl systolic

## 2024-01-31 ENCOUNTER — OUTPATIENT (OUTPATIENT)
Dept: OUTPATIENT SERVICES | Facility: HOSPITAL | Age: 89
LOS: 1 days | End: 2024-01-31
Payer: MEDICARE

## 2024-01-31 ENCOUNTER — APPOINTMENT (OUTPATIENT)
Dept: RADIOLOGY | Facility: HOSPITAL | Age: 89
End: 2024-01-31
Payer: MEDICARE

## 2024-01-31 ENCOUNTER — LABORATORY RESULT (OUTPATIENT)
Age: 89
End: 2024-01-31

## 2024-01-31 DIAGNOSIS — E78.5 HYPERLIPIDEMIA, UNSPECIFIED: ICD-10-CM

## 2024-01-31 DIAGNOSIS — Z90.49 ACQUIRED ABSENCE OF OTHER SPECIFIED PARTS OF DIGESTIVE TRACT: Chronic | ICD-10-CM

## 2024-01-31 DIAGNOSIS — Z79.01 LONG TERM (CURRENT) USE OF ANTICOAGULANTS: ICD-10-CM

## 2024-01-31 DIAGNOSIS — Z90.710 ACQUIRED ABSENCE OF BOTH CERVIX AND UTERUS: Chronic | ICD-10-CM

## 2024-01-31 DIAGNOSIS — Z90.89 ACQUIRED ABSENCE OF OTHER ORGANS: Chronic | ICD-10-CM

## 2024-01-31 DIAGNOSIS — G45.9 TRANSIENT CEREBRAL ISCHEMIC ATTACK, UNSPECIFIED: ICD-10-CM

## 2024-01-31 DIAGNOSIS — Z98.89 OTHER SPECIFIED POSTPROCEDURAL STATES: Chronic | ICD-10-CM

## 2024-01-31 DIAGNOSIS — I10 ESSENTIAL (PRIMARY) HYPERTENSION: ICD-10-CM

## 2024-01-31 DIAGNOSIS — I48.91 UNSPECIFIED ATRIAL FIBRILLATION: ICD-10-CM

## 2024-01-31 PROCEDURE — 71046 X-RAY EXAM CHEST 2 VIEWS: CPT

## 2024-01-31 PROCEDURE — 71046 X-RAY EXAM CHEST 2 VIEWS: CPT | Mod: 26

## 2024-02-05 LAB
ALBUMIN SERPL ELPH-MCNC: 4.2 G/DL
ALP BLD-CCNC: 97 U/L
ALT SERPL-CCNC: 21 U/L
ANION GAP SERPL CALC-SCNC: 12 MMOL/L
AST SERPL-CCNC: 25 U/L
BILIRUB SERPL-MCNC: 0.6 MG/DL
BUN SERPL-MCNC: 26 MG/DL
CALCIUM SERPL-MCNC: 9.9 MG/DL
CHLORIDE SERPL-SCNC: 106 MMOL/L
CHOLEST SERPL-MCNC: 141 MG/DL
CO2 SERPL-SCNC: 23 MMOL/L
CREAT SERPL-MCNC: 0.92 MG/DL
EGFR: 58 ML/MIN/1.73M2
GLUCOSE SERPL-MCNC: 105 MG/DL
HCT VFR BLD CALC: 35.9 %
HDLC SERPL-MCNC: 88 MG/DL
HGB BLD-MCNC: 11.8 G/DL
LDLC SERPL CALC-MCNC: 41 MG/DL
MCHC RBC-ENTMCNC: 32.9 GM/DL
MCHC RBC-ENTMCNC: 33.5 PG
MCV RBC AUTO: 102 FL
NONHDLC SERPL-MCNC: 53 MG/DL
PLATELET # BLD AUTO: 265 K/UL
POTASSIUM SERPL-SCNC: 3.9 MMOL/L
PROT SERPL-MCNC: 7 G/DL
RBC # BLD: 3.52 M/UL
RBC # FLD: 15.4 %
SODIUM SERPL-SCNC: 141 MMOL/L
TRIGL SERPL-MCNC: 56 MG/DL
TSH SERPL-ACNC: 4.22 UIU/ML
WBC # FLD AUTO: 7.64 K/UL

## 2024-02-21 ENCOUNTER — NON-APPOINTMENT (OUTPATIENT)
Age: 89
End: 2024-02-21

## 2024-02-21 ENCOUNTER — APPOINTMENT (OUTPATIENT)
Dept: ORTHOPEDIC SURGERY | Facility: CLINIC | Age: 89
End: 2024-02-21
Payer: MEDICARE

## 2024-02-21 VITALS — HEIGHT: 64 IN | WEIGHT: 121 LBS | BODY MASS INDEX: 20.66 KG/M2

## 2024-02-21 DIAGNOSIS — M48.061 SPINAL STENOSIS, LUMBAR REGION WITHOUT NEUROGENIC CLAUDICATION: ICD-10-CM

## 2024-02-21 DIAGNOSIS — M47.816 SPONDYLOSIS W/OUT MYELOPATHY OR RADICULOPATHY, LUMBAR REGION: ICD-10-CM

## 2024-02-21 DIAGNOSIS — M41.50 OTHER SECONDARY SCOLIOSIS, SITE UNSPECIFIED: ICD-10-CM

## 2024-02-21 PROCEDURE — 72100 X-RAY EXAM L-S SPINE 2/3 VWS: CPT

## 2024-02-21 PROCEDURE — 73502 X-RAY EXAM HIP UNI 2-3 VIEWS: CPT | Mod: RT

## 2024-02-21 PROCEDURE — 99204 OFFICE O/P NEW MOD 45 MIN: CPT

## 2024-02-21 NOTE — PHYSICAL EXAM
[de-identified] : Constitutional o Appearance : well-nourished, well developed, alert, in no acute distress  Head and Face o Head :  Inspection : atraumatic, normocephalic o Face :  Inspection : no visible rash or discoloration Respiratory o Respiratory Effort: breathing unlabored  Neurologic o Mental Status Examination :  Orientation : alert and oriented X 3 Psychiatric o Mood and Affect: mood normal, affect appropriate Cardiovascular o Observation/Palpation : - no swelling Lymphatic o Additional Nodes : No palpable lymph nodes present  Lumbosacral Spine o Inspection : no visible rash or discoloration o Palpation : paraspinal musculature nontender o Range of Motion : full and painless arc of motion in all planes o Muscle Strength : paraspinal muscle strength and tone within normal limits o Muscle Tone : paraspinal muscle strength and tone within normal limits Tests: straight leg test negative  o Right Knee :  Inspection/Palpation : no tenderness to palpation, .ehi no swelling  Range of Motion : 0-active flexion and extension full and painless, no crepitance  Stability : no valgus or varus instability present on provocative testing  Strength : flexion and extension 5/5  Tests and Signs : negative Anterior Drawer, negative Lachman, negative Erma's test    Right Lower Extremity o Buttock : no tenderness, swelling or deformities o Right Hip :  Inspection/Palpation : no tenderness, swelling or deformities  Range of Motion : full and pain with rotation, severe crepitance  Stability : joint stability intact  Strength : extension, flexion, adduction, abduction, internal rotation and external rotation 4/5   Left Lower Extremity o Buttock : no tenderness, swelling or deformities  o Left Hip :  Inspection/Palpation : no tenderness, swelling or deformities  Range of Motion : full and painless in all planes, no crepitance  Stability : joint stability intact  Strength : extension, flexion, adduction, abduction, internal rotation and external rotation 5/5   Gait: uses a cane only, inch shorter on the right, no significant extremity swelling or lymphedema  Radiology Results 2/21/2024 Lumbosacral Spine : AP and lateral views were obtained and reveal severe degenerative disc disease with foraminal stenosis and degenerative scoliosis, old compression fracture of L2 Pelvis: AP pelvis and lateral RIGHT hip were obtained and revealed severe degenerative arthritis of the right hip and with complete erosion of the acetabular Radiology Results  o Right Knee : Standing AP, lateral and skyline views of the knee were obtained and revealed excellent position of the total knee replacement with central tracking of the patella.

## 2024-02-21 NOTE — ADDENDUM
[FreeTextEntry1] : I, BC ALEMAN, acted solely as a scribe for Dr. Rene Olivas on this date 02/21/2024.  All medical record entries made by the Scribe were at my, Dr. Rene Olivas, direction and personally dictated by me on 02/21/2024. I have reviewed the chart and agree that the record accurately reflects my personal performance of the history, physical exam, assessment and plan. I have also personally directed, reviewed, and agreed with the chart.

## 2024-02-21 NOTE — HISTORY OF PRESENT ILLNESS
[de-identified] : 89 year old female presents complaining of right leg pain that started about 1 month ago. She denies injury. She states that her pain starts about the lateral aspect of her right hip and radiates towards her right knee. It then radiates into the right calf. She has difficulty walking and standing long periods of time. She denies numbness and tingling. She walks with a cane. Of note, she had her right knee replaced many years ago. She is hard of hearing. she presents with her son. She states she loses her balance frequently. She is walking with a walker. She states she is on Eliquis. She cannot take NSAIDs.

## 2024-02-21 NOTE — DISCUSSION/SUMMARY
[de-identified] : I went over the pathophysiology of the patient's symptoms in great detail with the patient. I discussed the underlying pathophysiology of the patient's condition in great detail with the patient. I went over the patient's x-rays with them in great detail. Proper walker walking protocol was discussed and demonstrated with the patient. A prescription for a walker was provided. She is not a surgical candidate for a right THR. We discussed the use of ice, Tylenol and anti-inflammatories to relieve pain.   All of their questions were answered. They understand and consent to the plan.   FU as needed.

## 2024-03-20 ENCOUNTER — TRANSCRIPTION ENCOUNTER (OUTPATIENT)
Age: 89
End: 2024-03-20

## 2024-03-20 RX ORDER — APIXABAN 5 MG/1
5 TABLET, FILM COATED ORAL
Qty: 180 | Refills: 2 | Status: ACTIVE | COMMUNITY
Start: 2018-09-07 | End: 1900-01-01

## 2024-03-20 RX ORDER — ATORVASTATIN CALCIUM 40 MG/1
40 TABLET, FILM COATED ORAL
Qty: 90 | Refills: 2 | Status: ACTIVE | COMMUNITY
Start: 2018-09-07 | End: 1900-01-01

## 2024-03-21 ENCOUNTER — TRANSCRIPTION ENCOUNTER (OUTPATIENT)
Age: 89
End: 2024-03-21

## 2024-04-17 ENCOUNTER — APPOINTMENT (OUTPATIENT)
Dept: PAIN MANAGEMENT | Facility: CLINIC | Age: 89
End: 2024-04-17
Payer: MEDICARE

## 2024-04-17 VITALS — HEIGHT: 64 IN | WEIGHT: 125 LBS | BODY MASS INDEX: 21.34 KG/M2

## 2024-04-17 DIAGNOSIS — M16.0 BILATERAL PRIMARY OSTEOARTHRITIS OF HIP: ICD-10-CM

## 2024-04-17 PROCEDURE — 99204 OFFICE O/P NEW MOD 45 MIN: CPT

## 2024-04-17 NOTE — HISTORY OF PRESENT ILLNESS
[FreeTextEntry1] : THIS PLEASANT PATIENT IS 89 year OLD  female  WHO PRESENT TODAY IN OFFICE WITH HER SON ALEXY WHO ASSISTED ANSWERING QUESTION FOR  RT HIP, GROIN RADATING DWN LEG WOULD LIKE DISS PLAN OF CARE    DATE OF INJURY/ONSET  OIN GOING ISSUE YEAR THAT PROGRESSING TO WORSE      PAIN LEVEL: 7-10/10     MECHANISM OF INJURY: UNKOWN INJURAY      QUALITY OF SYMPTOMS: SHOOTING, DULL/ACHE, RADATING       IMPROVES WITH:  EXTRA STRENGHT  ACETP     WORSE WITH:  ADDING WEIGHT, MOVING ARROUNG IN THE BED, SLEEPING AT NIGHT      PRIOR TREATMENT:  AS PER MD DIRECTION, PHYSICAL THEREPY WILL NOT BENIFIT FOR THE PATIENT          PRIOR IMAGING:  NO IMAGES COMPLETED       SCHOOL/SPORT/POSITION/OCCUPATION:       ADDITIONAL INFORMATION. BLOOD THINER: ELIQUIS 5 MG

## 2024-04-17 NOTE — ASSESSMENT
[FreeTextEntry1] : Subjective findings This 89-year-old female presents with multiple orthopedic issues.  No other not imaging studies in the electronic medical record the son is present at the time of evaluation is a very good historian.  Patient has been evaluated by an orthopedist who felt that the patient needed a surgical repair of the hip but is not a surgical candidate.  Patient has modified her lifestyle to now use a wheelchair to get around her residency.  Patient also has neuropathic discomfort which appears to be a static type pain but the patient has not had imaging studies of the lumbar spine due to the fact that the hip is causing so much discomfort.  With the patient blood thinners she also is unable to take prescribed a treatment of the pain.  The patient presents to us for options for the treatment of the patient's various issues the CV/Pulm/GI/Heme/Renal/Hepatic//Psych/Endo systems are reviewed. A full ROS was performed and reviewed with the patient today, see intake form.  Average pain score for the month is 4 out of ten. The patient's current medications are documented to the best of their ability. The patient has failed conservative therapies to include medical management, and physical activity to treat the pain. The patient has decreased function secondary to the pain. Objective findings There are no imaging studies to review for this patient.  The patient is in a wheelchair for comfort.  She is able to get to a standing position with difficulty.  Modified physical exam shows expect motor responses. Assessment Significant hip dysfunction and neurogenic pain. Plan After long discussion with the patient and the patient's son decided to try duloxetine and low doses to see if this is effective in reducing some of the neuropathic component of the pain.  The patient's son states that he has a relationship with an orthopedic surgeon and will talk to the orthopedic surgeon about the possibility of having a surgical evaluation of the hip again with the possibility of repairing this so that this functional 89-year-old can get back to a more normal lifestyle.  Patient also will be registered for medical marijuana as an option if the duloxetine is ineffective.  This note was generated by using Dragon medical dictation software.  A reasonable effort has been made for proofreading its contents, but typos may still remain.  If there are any questions or points of clarification needed, please notify my office.

## 2024-05-02 ENCOUNTER — TRANSCRIPTION ENCOUNTER (OUTPATIENT)
Age: 89
End: 2024-05-02

## 2024-05-05 RX ORDER — DULOXETINE HYDROCHLORIDE 20 MG/1
20 CAPSULE, DELAYED RELEASE PELLETS ORAL
Qty: 180 | Refills: 0 | Status: ACTIVE | COMMUNITY
Start: 2024-04-17 | End: 1900-01-01

## 2024-06-07 ENCOUNTER — NON-APPOINTMENT (OUTPATIENT)
Age: 89
End: 2024-06-07

## 2024-06-07 ENCOUNTER — APPOINTMENT (OUTPATIENT)
Dept: CARDIOLOGY | Facility: CLINIC | Age: 89
End: 2024-06-07
Payer: MEDICARE

## 2024-06-07 VITALS
WEIGHT: 125 LBS | DIASTOLIC BLOOD PRESSURE: 83 MMHG | RESPIRATION RATE: 18 BRPM | BODY MASS INDEX: 21.34 KG/M2 | HEIGHT: 64 IN | SYSTOLIC BLOOD PRESSURE: 157 MMHG | HEART RATE: 93 BPM | OXYGEN SATURATION: 98 %

## 2024-06-07 DIAGNOSIS — I48.91 UNSPECIFIED ATRIAL FIBRILLATION: ICD-10-CM

## 2024-06-07 DIAGNOSIS — I10 ESSENTIAL (PRIMARY) HYPERTENSION: ICD-10-CM

## 2024-06-07 DIAGNOSIS — Z79.01 LONG TERM (CURRENT) USE OF ANTICOAGULANTS: ICD-10-CM

## 2024-06-07 DIAGNOSIS — E78.5 HYPERLIPIDEMIA, UNSPECIFIED: ICD-10-CM

## 2024-06-07 PROCEDURE — 99214 OFFICE O/P EST MOD 30 MIN: CPT

## 2024-06-07 PROCEDURE — 93000 ELECTROCARDIOGRAM COMPLETE: CPT

## 2024-06-07 NOTE — PHYSICAL EXAM
[Frail] : frail [Normal S1, S2] : normal S1, S2 [No Rub] : no rub [No Gallop] : no gallop [Murmur] : murmur [Normal] : alert and oriented, normal memory [de-identified] : ll/Vl systolic

## 2024-06-07 NOTE — HISTORY OF PRESENT ILLNESS
[FreeTextEntry1] : Accompanied by her son Mariano. Since last visit with me, family has noticed that she is becoming more forgetful.  There was concern that maybe she had UTI, and she did go to primary care provider and UA was positive and she was started on antibiotics. Hearing has worsened.  Has been going to dentist regarding tooth pain .. started on antibiotics.  No chest pain, shortness of breath, dyspnea on exertion.  No palpitations.  No dizziness.  No syncope. She does have mild lower extremity edema, resolves if she keeps her feet elevated.  No orthopnea.  No PND. Remains on anticoagulation.  Denies any excessive ecchymosis, bleeding, black or bloody stools, hematuria or epistaxis. Started on duloxetine by pain management, and there was initial improvement but pain has not recurred. Remains on anticoagulation.  Denies any excessive ecchymosis, bleeding, black or bloody stools, hematuria or epistaxis.

## 2024-06-07 NOTE — DISCUSSION/SUMMARY
[FreeTextEntry1] : 89-year-old woman with persistent atrial fibrillation, anticoagulated, hypertension, hyperlipidemia, prior history of TIAs. Complains of lower extremity edema as noted above. On physical examination, blood pressure stable.  There is no JVD.  She does have mild lower extremity edema. EKG is atrial fibrillation. Edema possibly related to venous insufficiency.  Plan 1.  Advised to elevate lower extremities to what ever extent possible. 2.  Current medications reviewed, no changes. 3.  She will follow-up with me in the office in 4 months. 4.  She will continue follow-up with primary care provider. 5.  The above was reviewed with the patient and her son, all questions answered.  [EKG obtained to assist in diagnosis and management of assessed problem(s)] : EKG obtained to assist in diagnosis and management of assessed problem(s)

## 2024-06-07 NOTE — REASON FOR VISIT
[FreeTextEntry1] : Cardiology follow-up visit for evaluation management of persistent atrial fibrillation, anticoagulated, hypertension, hyperlipidemia, prior history of TIA.

## 2024-06-07 NOTE — CARDIOLOGY SUMMARY
[de-identified] : Jan 17 2024. Atrial fibrillation  ABNORMAL [de-identified] : Sep 19 2023. Normal LV function. Mild AS. Mild AR and MR

## 2024-06-12 ENCOUNTER — RX RENEWAL (OUTPATIENT)
Age: 89
End: 2024-06-12

## 2024-06-12 RX ORDER — DILTIAZEM HYDROCHLORIDE 180 MG/1
180 CAPSULE, EXTENDED RELEASE ORAL
Qty: 90 | Refills: 1 | Status: ACTIVE | COMMUNITY
Start: 2020-06-25 | End: 1900-01-01

## 2024-07-12 ENCOUNTER — RX RENEWAL (OUTPATIENT)
Age: 89
End: 2024-07-12

## 2024-07-12 RX ORDER — FLUTICASONE PROPIONATE AND SALMETEROL 250; 50 UG/1; UG/1
250-50 POWDER RESPIRATORY (INHALATION)
Qty: 180 | Refills: 1 | Status: ACTIVE | COMMUNITY
Start: 2024-07-12 | End: 1900-01-01

## 2024-10-07 ENCOUNTER — NON-APPOINTMENT (OUTPATIENT)
Age: 89
End: 2024-10-07

## 2024-10-07 ENCOUNTER — APPOINTMENT (OUTPATIENT)
Dept: CARDIOLOGY | Facility: CLINIC | Age: 89
End: 2024-10-07
Payer: MEDICARE

## 2024-10-07 VITALS
HEART RATE: 70 BPM | DIASTOLIC BLOOD PRESSURE: 70 MMHG | RESPIRATION RATE: 18 BRPM | SYSTOLIC BLOOD PRESSURE: 163 MMHG | HEIGHT: 64 IN | OXYGEN SATURATION: 98 % | BODY MASS INDEX: 21.34 KG/M2 | WEIGHT: 125 LBS

## 2024-10-07 DIAGNOSIS — I10 ESSENTIAL (PRIMARY) HYPERTENSION: ICD-10-CM

## 2024-10-07 DIAGNOSIS — E78.5 HYPERLIPIDEMIA, UNSPECIFIED: ICD-10-CM

## 2024-10-07 DIAGNOSIS — I35.0 NONRHEUMATIC AORTIC (VALVE) STENOSIS: ICD-10-CM

## 2024-10-07 DIAGNOSIS — I48.91 UNSPECIFIED ATRIAL FIBRILLATION: ICD-10-CM

## 2024-10-07 DIAGNOSIS — Z79.01 LONG TERM (CURRENT) USE OF ANTICOAGULANTS: ICD-10-CM

## 2024-10-07 PROCEDURE — 93000 ELECTROCARDIOGRAM COMPLETE: CPT

## 2024-10-07 PROCEDURE — 99215 OFFICE O/P EST HI 40 MIN: CPT

## 2024-11-05 ENCOUNTER — TRANSCRIPTION ENCOUNTER (OUTPATIENT)
Age: 89
End: 2024-11-05

## 2024-11-05 ENCOUNTER — NON-APPOINTMENT (OUTPATIENT)
Age: 89
End: 2024-11-05

## 2024-11-11 ENCOUNTER — TRANSCRIPTION ENCOUNTER (OUTPATIENT)
Age: 89
End: 2024-11-11

## 2024-11-18 ENCOUNTER — RX RENEWAL (OUTPATIENT)
Age: 89
End: 2024-11-18

## 2024-11-20 ENCOUNTER — TRANSCRIPTION ENCOUNTER (OUTPATIENT)
Age: 89
End: 2024-11-20

## 2024-12-02 ENCOUNTER — RX RENEWAL (OUTPATIENT)
Age: 88
End: 2024-12-02

## 2024-12-22 ENCOUNTER — EMERGENCY (EMERGENCY)
Facility: HOSPITAL | Age: 88
LOS: 1 days | Discharge: HOME | End: 2024-12-22
Attending: STUDENT IN AN ORGANIZED HEALTH CARE EDUCATION/TRAINING PROGRAM | Admitting: STUDENT IN AN ORGANIZED HEALTH CARE EDUCATION/TRAINING PROGRAM
Payer: MEDICARE

## 2024-12-22 VITALS
WEIGHT: 119.93 LBS | HEIGHT: 62 IN | RESPIRATION RATE: 16 BRPM | OXYGEN SATURATION: 96 % | HEART RATE: 90 BPM | SYSTOLIC BLOOD PRESSURE: 156 MMHG | TEMPERATURE: 97 F | DIASTOLIC BLOOD PRESSURE: 78 MMHG

## 2024-12-22 VITALS — RESPIRATION RATE: 16 BRPM | HEART RATE: 97 BPM | DIASTOLIC BLOOD PRESSURE: 68 MMHG | SYSTOLIC BLOOD PRESSURE: 142 MMHG

## 2024-12-22 DIAGNOSIS — Z98.89 OTHER SPECIFIED POSTPROCEDURAL STATES: Chronic | ICD-10-CM

## 2024-12-22 DIAGNOSIS — Z90.49 ACQUIRED ABSENCE OF OTHER SPECIFIED PARTS OF DIGESTIVE TRACT: Chronic | ICD-10-CM

## 2024-12-22 DIAGNOSIS — Z90.89 ACQUIRED ABSENCE OF OTHER ORGANS: Chronic | ICD-10-CM

## 2024-12-22 DIAGNOSIS — Z90.710 ACQUIRED ABSENCE OF BOTH CERVIX AND UTERUS: Chronic | ICD-10-CM

## 2024-12-22 LAB
ALBUMIN SERPL ELPH-MCNC: 3.5 G/DL — SIGNIFICANT CHANGE UP (ref 3.3–5)
ALP SERPL-CCNC: 93 U/L — SIGNIFICANT CHANGE UP (ref 40–120)
ALT FLD-CCNC: 43 U/L — SIGNIFICANT CHANGE UP (ref 10–45)
ANION GAP SERPL CALC-SCNC: 9 MMOL/L — SIGNIFICANT CHANGE UP (ref 5–17)
APPEARANCE UR: ABNORMAL
AST SERPL-CCNC: 69 U/L — HIGH (ref 10–40)
BACTERIA # UR AUTO: ABNORMAL /HPF
BASOPHILS # BLD AUTO: 0.02 K/UL — SIGNIFICANT CHANGE UP (ref 0–0.2)
BASOPHILS NFR BLD AUTO: 0.2 % — SIGNIFICANT CHANGE UP (ref 0–2)
BILIRUB SERPL-MCNC: 1.2 MG/DL — SIGNIFICANT CHANGE UP (ref 0.2–1.2)
BILIRUB UR-MCNC: NEGATIVE — SIGNIFICANT CHANGE UP
BUN SERPL-MCNC: 22 MG/DL — SIGNIFICANT CHANGE UP (ref 7–23)
CALCIUM SERPL-MCNC: 9.7 MG/DL — SIGNIFICANT CHANGE UP (ref 8.4–10.5)
CHLORIDE SERPL-SCNC: 107 MMOL/L — SIGNIFICANT CHANGE UP (ref 96–108)
CK SERPL-CCNC: 309 U/L — HIGH (ref 25–170)
CO2 SERPL-SCNC: 26 MMOL/L — SIGNIFICANT CHANGE UP (ref 22–31)
COLOR SPEC: YELLOW — SIGNIFICANT CHANGE UP
CREAT SERPL-MCNC: 0.71 MG/DL — SIGNIFICANT CHANGE UP (ref 0.5–1.3)
DIFF PNL FLD: ABNORMAL
EGFR: 79 ML/MIN/1.73M2 — SIGNIFICANT CHANGE UP
EOSINOPHIL # BLD AUTO: 0 K/UL — SIGNIFICANT CHANGE UP (ref 0–0.5)
EOSINOPHIL NFR BLD AUTO: 0 % — SIGNIFICANT CHANGE UP (ref 0–6)
EPI CELLS # UR: PRESENT
GLUCOSE SERPL-MCNC: 112 MG/DL — HIGH (ref 70–99)
GLUCOSE UR QL: NEGATIVE MG/DL — SIGNIFICANT CHANGE UP
HCT VFR BLD CALC: 36.6 % — SIGNIFICANT CHANGE UP (ref 34.5–45)
HGB BLD-MCNC: 12.1 G/DL — SIGNIFICANT CHANGE UP (ref 11.5–15.5)
IMM GRANULOCYTES NFR BLD AUTO: 0.7 % — SIGNIFICANT CHANGE UP (ref 0–0.9)
KETONES UR-MCNC: NEGATIVE MG/DL — SIGNIFICANT CHANGE UP
LEUKOCYTE ESTERASE UR-ACNC: ABNORMAL
LYMPHOCYTES # BLD AUTO: 0.68 K/UL — LOW (ref 1–3.3)
LYMPHOCYTES # BLD AUTO: 5.9 % — LOW (ref 13–44)
MAGNESIUM SERPL-MCNC: 1.9 MG/DL — SIGNIFICANT CHANGE UP (ref 1.6–2.6)
MCHC RBC-ENTMCNC: 33.1 G/DL — SIGNIFICANT CHANGE UP (ref 32–36)
MCHC RBC-ENTMCNC: 33.9 PG — SIGNIFICANT CHANGE UP (ref 27–34)
MCV RBC AUTO: 102.5 FL — HIGH (ref 80–100)
MONOCYTES # BLD AUTO: 0.98 K/UL — HIGH (ref 0–0.9)
MONOCYTES NFR BLD AUTO: 8.5 % — SIGNIFICANT CHANGE UP (ref 2–14)
NEUTROPHILS # BLD AUTO: 9.8 K/UL — HIGH (ref 1.8–7.4)
NEUTROPHILS NFR BLD AUTO: 84.7 % — HIGH (ref 43–77)
NITRITE UR-MCNC: POSITIVE
NRBC # BLD: 0 /100 WBCS — SIGNIFICANT CHANGE UP (ref 0–0)
PH UR: 6.5 — SIGNIFICANT CHANGE UP (ref 5–8)
PLATELET # BLD AUTO: 190 K/UL — SIGNIFICANT CHANGE UP (ref 150–400)
POTASSIUM SERPL-MCNC: 5 MMOL/L — SIGNIFICANT CHANGE UP (ref 3.5–5.3)
POTASSIUM SERPL-SCNC: 5 MMOL/L — SIGNIFICANT CHANGE UP (ref 3.5–5.3)
PROT SERPL-MCNC: 7.3 G/DL — SIGNIFICANT CHANGE UP (ref 6–8.3)
PROT UR-MCNC: 100 MG/DL
RBC # BLD: 3.57 M/UL — LOW (ref 3.8–5.2)
RBC # FLD: 14.2 % — SIGNIFICANT CHANGE UP (ref 10.3–14.5)
RBC CASTS # UR COMP ASSIST: 3 /HPF — SIGNIFICANT CHANGE UP (ref 0–4)
SODIUM SERPL-SCNC: 142 MMOL/L — SIGNIFICANT CHANGE UP (ref 135–145)
SP GR SPEC: 1.02 — SIGNIFICANT CHANGE UP (ref 1–1.03)
TROPONIN I, HIGH SENSITIVITY RESULT: 26.1 NG/L — SIGNIFICANT CHANGE UP
UROBILINOGEN FLD QL: 0.2 MG/DL — SIGNIFICANT CHANGE UP (ref 0.2–1)
WBC # BLD: 11.56 K/UL — HIGH (ref 3.8–10.5)
WBC # FLD AUTO: 11.56 K/UL — HIGH (ref 3.8–10.5)
WBC UR QL: 4 /HPF — SIGNIFICANT CHANGE UP (ref 0–5)

## 2024-12-22 PROCEDURE — 73610 X-RAY EXAM OF ANKLE: CPT

## 2024-12-22 PROCEDURE — 72125 CT NECK SPINE W/O DYE: CPT | Mod: 26,MC

## 2024-12-22 PROCEDURE — 84484 ASSAY OF TROPONIN QUANT: CPT

## 2024-12-22 PROCEDURE — 85025 COMPLETE CBC W/AUTO DIFF WBC: CPT

## 2024-12-22 PROCEDURE — 36415 COLL VENOUS BLD VENIPUNCTURE: CPT

## 2024-12-22 PROCEDURE — 87086 URINE CULTURE/COLONY COUNT: CPT

## 2024-12-22 PROCEDURE — 99285 EMERGENCY DEPT VISIT HI MDM: CPT

## 2024-12-22 PROCEDURE — 71260 CT THORAX DX C+: CPT | Mod: 26,MC

## 2024-12-22 PROCEDURE — 87186 SC STD MICRODIL/AGAR DIL: CPT

## 2024-12-22 PROCEDURE — 99053 MED SERV 10PM-8AM 24 HR FAC: CPT

## 2024-12-22 PROCEDURE — 70450 CT HEAD/BRAIN W/O DYE: CPT | Mod: MC

## 2024-12-22 PROCEDURE — 74177 CT ABD & PELVIS W/CONTRAST: CPT | Mod: 26,MC

## 2024-12-22 PROCEDURE — 70450 CT HEAD/BRAIN W/O DYE: CPT | Mod: 26,MC

## 2024-12-22 PROCEDURE — 93005 ELECTROCARDIOGRAM TRACING: CPT

## 2024-12-22 PROCEDURE — 96365 THER/PROPH/DIAG IV INF INIT: CPT | Mod: XU

## 2024-12-22 PROCEDURE — 71260 CT THORAX DX C+: CPT | Mod: MC

## 2024-12-22 PROCEDURE — 73562 X-RAY EXAM OF KNEE 3: CPT

## 2024-12-22 PROCEDURE — 80053 COMPREHEN METABOLIC PANEL: CPT

## 2024-12-22 PROCEDURE — 99285 EMERGENCY DEPT VISIT HI MDM: CPT | Mod: 25

## 2024-12-22 PROCEDURE — 73610 X-RAY EXAM OF ANKLE: CPT | Mod: 26,50

## 2024-12-22 PROCEDURE — 72192 CT PELVIS W/O DYE: CPT | Mod: MC

## 2024-12-22 PROCEDURE — 93010 ELECTROCARDIOGRAM REPORT: CPT

## 2024-12-22 PROCEDURE — 72192 CT PELVIS W/O DYE: CPT | Mod: 26,XE,MC

## 2024-12-22 PROCEDURE — 82550 ASSAY OF CK (CPK): CPT

## 2024-12-22 PROCEDURE — 72125 CT NECK SPINE W/O DYE: CPT | Mod: MC

## 2024-12-22 PROCEDURE — 83735 ASSAY OF MAGNESIUM: CPT

## 2024-12-22 PROCEDURE — 74177 CT ABD & PELVIS W/CONTRAST: CPT | Mod: MC

## 2024-12-22 PROCEDURE — 73562 X-RAY EXAM OF KNEE 3: CPT | Mod: 26,50

## 2024-12-22 PROCEDURE — 96361 HYDRATE IV INFUSION ADD-ON: CPT

## 2024-12-22 PROCEDURE — 81001 URINALYSIS AUTO W/SCOPE: CPT

## 2024-12-22 RX ORDER — CEFTRIAXONE SODIUM 1 G
1000 VIAL (EA) INJECTION ONCE
Refills: 0 | Status: COMPLETED | OUTPATIENT
Start: 2024-12-22 | End: 2024-12-22

## 2024-12-22 RX ORDER — SODIUM CHLORIDE 9 MG/ML
500 INJECTION, SOLUTION INTRAMUSCULAR; INTRAVENOUS; SUBCUTANEOUS ONCE
Refills: 0 | Status: COMPLETED | OUTPATIENT
Start: 2024-12-22 | End: 2024-12-22

## 2024-12-22 RX ORDER — LIDOCAINE 40 MG/G
1 CREAM TOPICAL ONCE
Refills: 0 | Status: COMPLETED | OUTPATIENT
Start: 2024-12-22 | End: 2024-12-22

## 2024-12-22 RX ORDER — DILTIAZEM HYDROCHLORIDE 240 MG/1
1 CAPSULE, COATED, EXTENDED RELEASE ORAL
Refills: 0 | DISCHARGE

## 2024-12-22 RX ORDER — FLUTICASONE PROPIONATE AND SALMETEROL XINAFOATE 45; 21 UG/1; UG/1
1 AEROSOL, METERED RESPIRATORY (INHALATION)
Refills: 0 | DISCHARGE

## 2024-12-22 RX ORDER — ACETAMINOPHEN 500MG 500 MG/1
975 TABLET, COATED ORAL ONCE
Refills: 0 | Status: COMPLETED | OUTPATIENT
Start: 2024-12-22 | End: 2024-12-22

## 2024-12-22 RX ORDER — CEFPODOXIME PROXETIL 100 MG/5ML
1 GRANULE, FOR SUSPENSION ORAL
Qty: 14 | Refills: 0
Start: 2024-12-22 | End: 2024-12-28

## 2024-12-22 RX ORDER — APIXABAN 2.5 MG/1
1 TABLET, FILM COATED ORAL
Refills: 0 | DISCHARGE

## 2024-12-22 RX ADMIN — LIDOCAINE 1 PATCH: 40 CREAM TOPICAL at 08:30

## 2024-12-22 RX ADMIN — ACETAMINOPHEN 500MG 975 MILLIGRAM(S): 500 TABLET, COATED ORAL at 08:30

## 2024-12-22 RX ADMIN — Medication 1000 MILLIGRAM(S): at 12:04

## 2024-12-22 RX ADMIN — SODIUM CHLORIDE 1000 MILLILITER(S): 9 INJECTION, SOLUTION INTRAMUSCULAR; INTRAVENOUS; SUBCUTANEOUS at 08:30

## 2024-12-22 RX ADMIN — SODIUM CHLORIDE 500 MILLILITER(S): 9 INJECTION, SOLUTION INTRAMUSCULAR; INTRAVENOUS; SUBCUTANEOUS at 09:30

## 2024-12-22 RX ADMIN — Medication 100 MILLIGRAM(S): at 11:29

## 2024-12-22 RX ADMIN — ACETAMINOPHEN 500MG 975 MILLIGRAM(S): 500 TABLET, COATED ORAL at 00:00

## 2024-12-22 NOTE — ED ADULT NURSE REASSESSMENT NOTE - NS ED NURSE REASSESS COMMENT FT1
Pt ambulated with RW and 1 person assist.  Son states he has arranged for 24 hour care of his mom and has RW at home.  Gave pt non skid socks.

## 2024-12-22 NOTE — CHART NOTE - NSCHARTNOTEFT_GEN_A_CORE
SW met with the pt and the family at beside to assess for social work needs and discuss home assessment of safety.  Pt is a 89 y/o female presented to ED for fall.  Pt is alert and ambulate with walker.  Pt family reported that pt suffered a fall at home yesterday uncertain about how long she was on the floor.  Pt has an aide 7/week from 9-3.  Pt lives alone in a private house, son lives close by and is being supportive. Pt's brother is also with her and is planning to stay with her for few days.  Attending has recommended follow with  primary and son will schedule appt, declined SW assistance.  Pt has all the necessary follow appt including cardiology and worker did not identify any particular concerns, worker continues to remain available for assistance.
Epidural

## 2024-12-22 NOTE — ED PROVIDER NOTE - PATIENT PORTAL LINK FT
You can access the FollowMyHealth Patient Portal offered by [facility] by registering at the following website: [email or web address]. By joining [employer]’s FollowMyHealth portal, you will also be able to view your health information using other applications (apps) compatible with our system.

## 2024-12-22 NOTE — ED PROVIDER NOTE - CLINICAL SUMMARY MEDICAL DECISION MAKING FREE TEXT BOX
Patient is a 89 year-old-female with history of atrial fibrillation on Eliquis, HTN and HLD presents with back pain and hip/ankle pain after an unwitnessed fall at home. Brought in by EMS and accompanied by son Khang at beside who reports that around 7am patient was found by him to be on the floor. Patient was last seen by her aide around 3pm when the aide left and this morning, her son reviewed the ring camera footage and noted her to be on the ground, suspected that she fell around 2am. Patient told her son that she did not fall but slipped and then just slept on the ground. Son reports that patient is back to her usual mental status and she usually ambulates with a walker and lives alone with 9a-3p aide help. Patient is c/o hip pain and back pain at this time. Hard of hearing. Exam showed an elderly woman lying on her right side, c/o pain to palpation over the lower back and R hip and ankle. Unclear etiology of unwitnessed fall at this time as patient is not able to provide a good history, will obtain workup to evaluate for possible infectious vs metabolic vs cardiac etiologies. Will obtain imaging to rule out acute injuries. Workup includes labs, UA/UC, ECG, xray of BL knee/ankle, and CT of head/c-spine/chest/abdomen/pelvis. Disposition pending workup and shared decision making with family at bedside. Antonio Attending Physician   Patient is a 89 year-old-female with history of atrial fibrillation on Eliquis, HTN and HLD presents with back pain and hip/ankle pain after an unwitnessed fall at home. Brought in by EMS and accompanied by son Khang at beside who reports that around 7am patient was found by him to be on the floor. Patient was last seen by her aide around 3pm when the aide left and this morning, her son reviewed the ring camera footage and noted her to be on the ground, suspected that she fell around 2am. Patient told her son that she did not fall but slipped and then just slept on the ground. Son reports that patient is back to her usual mental status and she usually ambulates with a walker and lives alone with 9a-3p aide help. Patient is c/o hip pain and back pain at this time. Hard of hearing. Exam showed an elderly woman lying on her right side, c/o pain to palpation over the lower back and R hip and ankle. Unclear etiology of unwitnessed fall at this time as patient is not able to provide a good history, will obtain workup to evaluate for possible infectious vs metabolic vs cardiac etiologies. Will obtain imaging to rule out acute injuries. Workup includes labs, UA/UC, ECG, xray of BL knee/ankle, and CT of head/c-spine/chest/abdomen/pelvis. Disposition pending workup and shared decision making with family at bedside.

## 2024-12-22 NOTE — ED PROVIDER NOTE - PROGRESS NOTE DETAILS
Antonio Attending Physician  No acute findings on CT head/c-spine/chest/abdomen/pelvis. UA concerning for UTI. Discussed results with son at bedside. Patient ambulated with walker in the ER and son reports that he feels comfortable taking her home. He has arranged for 24-hr aide help at home for her. Discussed return precautions with son and he voiced understanding.

## 2024-12-22 NOTE — ED PROVIDER NOTE - PHYSICAL EXAMINATION
Vitals: I have reviewed the patients vital signs  General: Elderly woman, not in acute respiratory distress, lying on her right side   HEENT: Atraumatic, normocephalic, airway patent  Eyes: EOMI, tracking appropriately  Neck: no tracheal deviation  Chest/Lungs: symmetric chest rise, no WOB, no gross wheezing or crackles noted   Heart: skin and extremities well perfused, regular rate and rhythm  Abdomen: soft, nontender and nondistended   Neuro: Alert, hard of hearing, oriented to self and , CN grossly intact  MSK: TTP over R hip/R ankle, moving BL upper extremities equally, intact passive ROM of BL lower extremities, ?lower midline lumbar tenderness, no cervical/thoracic midline tenderness   Skin: skin abrasions noted over BL ankle Vitals: I have reviewed the patients vital signs  General: Elderly woman, not in acute respiratory distress, lying on her right side   HEENT: Atraumatic, normocephalic, airway patent  Eyes: EOMI, tracking appropriately  Neck: no tracheal deviation  Chest/Lungs: symmetric chest rise, no WOB, no gross wheezing or crackles noted   Heart: skin and extremities well perfused, regular rate and rhythm  Abdomen: soft, nontender and nondistended   Neuro: Alert, hard of hearing, oriented to self and , CN grossly intact  MSK: TTP over R hip/R ankle, moving BL upper extremities equally, intact passive ROM of BL lower extremities, ?lower midline lumbar tenderness, no cervical/thoracic midline tenderness   Skin: skin abrasions noted over BL shins

## 2024-12-22 NOTE — ED PROVIDER NOTE - OBJECTIVE STATEMENT
89 year old female with PMHx HTN, MVP, arthritis s/p unwitnessed fall. Pt's son at bedside states that he was called by his brother at 7am who saw the pt on the floor through the home cameras; per the footage, pt was likely on the floor since 2am. Pt's son reports the pt said she "slipped rather than fell" trying to ge to the bathroom, did not hit her head, c/o pain in neck likely from being on the floor, hip, ankle and scraped her leg. Pt's son endorses pt has an aide from 9am-3pm. Pt's son reports pt is at baseline. Pt on eliquis.

## 2024-12-22 NOTE — ED ADULT NURSE NOTE - NSFALLHARMRISKINTERV_ED_ALL_ED

## 2024-12-22 NOTE — ED PROVIDER NOTE - NSFOLLOWUPINSTRUCTIONS_ED_ALL_ED_FT
You were seen in the emergency department for unwitnessed fall at home and found to have acute urinary tract infection. You can find the results of all the tests in this discharge packet. Please follow up with your primary care doctor within 48 hours for continuation of care.     Return to the emergency department if you experience any new/concerning/worsening symptoms such as but not limited to: fever (>100.3F), intractable nausea, vomiting, chest pain, shortness of breath, abdominal pain.     You are prescribed:  1) Cefpodoxime: 200mg every 12 hours for 7 days     If you do not have a physician or have difficulty following up, please call: [phone] to obtain a [facility] doctor or specialist who can provide follow up.

## 2024-12-22 NOTE — ED ADULT NURSE NOTE - OBJECTIVE STATEMENT
Pt s/p unwitnessed fall at home, pt was found on the floor since approx 2am. Pt takes eliquis. C/o right hip and back pain.  Son HCP is at bedside.

## 2024-12-22 NOTE — ED PROVIDER NOTE - HIV OFFER
The patient's assessment was reviewed with Dr. Plasencia and a collaborative plan of care was established.
Opt out

## 2024-12-22 NOTE — ED ADULT TRIAGE NOTE - CHIEF COMPLAINT QUOTE
Pt s/p unwitnessed fall at home, pt was found on the floor since approx 2am. Pt takes eliquis. C/o right hip and back pain.

## 2024-12-22 NOTE — ED ADULT NURSE NOTE - NSICDXPASTSURGICALHX_GEN_ALL_CORE_FT
PAST SURGICAL HISTORY:  S/P appendectomy     S/P bladder repair prolapsed bladder and uterus  all done w hysterectomy    S/P breast biopsy, right benign    S/P cholecystectomy     S/P hysterectomy partial    S/P tonsillectomy

## 2025-03-06 ENCOUNTER — RX RENEWAL (OUTPATIENT)
Age: 89
End: 2025-03-06

## 2025-04-07 ENCOUNTER — APPOINTMENT (OUTPATIENT)
Dept: CARDIOLOGY | Facility: CLINIC | Age: 89
End: 2025-04-07
Payer: MEDICARE

## 2025-04-07 VITALS
OXYGEN SATURATION: 96 % | HEART RATE: 75 BPM | RESPIRATION RATE: 14 BRPM | BODY MASS INDEX: 21.34 KG/M2 | WEIGHT: 125 LBS | HEIGHT: 64 IN | SYSTOLIC BLOOD PRESSURE: 145 MMHG | DIASTOLIC BLOOD PRESSURE: 70 MMHG

## 2025-04-07 DIAGNOSIS — E78.5 HYPERLIPIDEMIA, UNSPECIFIED: ICD-10-CM

## 2025-04-07 DIAGNOSIS — G45.9 TRANSIENT CEREBRAL ISCHEMIC ATTACK, UNSPECIFIED: ICD-10-CM

## 2025-04-07 DIAGNOSIS — I48.91 UNSPECIFIED ATRIAL FIBRILLATION: ICD-10-CM

## 2025-04-07 DIAGNOSIS — I10 ESSENTIAL (PRIMARY) HYPERTENSION: ICD-10-CM

## 2025-04-07 DIAGNOSIS — Z79.01 LONG TERM (CURRENT) USE OF ANTICOAGULANTS: ICD-10-CM
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
## 2025-04-07 PROCEDURE — 93000 ELECTROCARDIOGRAM COMPLETE: CPT

## 2025-04-07 PROCEDURE — 99215 OFFICE O/P EST HI 40 MIN: CPT

## 2025-04-07 PROCEDURE — G2211 COMPLEX E/M VISIT ADD ON: CPT

## 2025-04-07 PROCEDURE — 93306 TTE W/DOPPLER COMPLETE: CPT

## 2025-04-17 ENCOUNTER — RX RENEWAL (OUTPATIENT)
Age: 89
End: 2025-04-17

## 2025-04-28 ENCOUNTER — RX RENEWAL (OUTPATIENT)
Age: 89
End: 2025-04-28

## 2025-05-11 ENCOUNTER — INPATIENT (INPATIENT)
Facility: HOSPITAL | Age: 89
LOS: 4 days | Discharge: HOME HEALTH | DRG: 562 | End: 2025-05-16
Attending: INTERNAL MEDICINE | Admitting: INTERNAL MEDICINE
Payer: MEDICARE

## 2025-05-11 VITALS
WEIGHT: 115.08 LBS | RESPIRATION RATE: 18 BRPM | HEART RATE: 71 BPM | SYSTOLIC BLOOD PRESSURE: 127 MMHG | HEIGHT: 64 IN | TEMPERATURE: 98 F | OXYGEN SATURATION: 96 % | DIASTOLIC BLOOD PRESSURE: 66 MMHG

## 2025-05-11 DIAGNOSIS — Z90.49 ACQUIRED ABSENCE OF OTHER SPECIFIED PARTS OF DIGESTIVE TRACT: Chronic | ICD-10-CM

## 2025-05-11 DIAGNOSIS — Z90.710 ACQUIRED ABSENCE OF BOTH CERVIX AND UTERUS: Chronic | ICD-10-CM

## 2025-05-11 DIAGNOSIS — R26.2 DIFFICULTY IN WALKING, NOT ELSEWHERE CLASSIFIED: ICD-10-CM

## 2025-05-11 DIAGNOSIS — Z90.89 ACQUIRED ABSENCE OF OTHER ORGANS: Chronic | ICD-10-CM

## 2025-05-11 DIAGNOSIS — Z98.89 OTHER SPECIFIED POSTPROCEDURAL STATES: Chronic | ICD-10-CM

## 2025-05-11 LAB
ALBUMIN SERPL ELPH-MCNC: 3.1 G/DL — LOW (ref 3.3–5)
ALP SERPL-CCNC: 70 U/L — SIGNIFICANT CHANGE UP (ref 40–120)
ALT FLD-CCNC: 25 U/L — SIGNIFICANT CHANGE UP (ref 10–45)
ANION GAP SERPL CALC-SCNC: 8 MMOL/L — SIGNIFICANT CHANGE UP (ref 5–17)
APPEARANCE UR: ABNORMAL
APTT BLD: 35.4 SEC — SIGNIFICANT CHANGE UP (ref 26.1–36.8)
AST SERPL-CCNC: 27 U/L — SIGNIFICANT CHANGE UP (ref 10–40)
BACTERIA # UR AUTO: ABNORMAL /HPF
BASOPHILS # BLD AUTO: 0.03 K/UL — SIGNIFICANT CHANGE UP (ref 0–0.2)
BASOPHILS NFR BLD AUTO: 0.2 % — SIGNIFICANT CHANGE UP (ref 0–2)
BILIRUB SERPL-MCNC: 0.8 MG/DL — SIGNIFICANT CHANGE UP (ref 0.2–1.2)
BILIRUB UR-MCNC: NEGATIVE — SIGNIFICANT CHANGE UP
BLD GP AB SCN SERPL QL: SIGNIFICANT CHANGE UP
BUN SERPL-MCNC: 31 MG/DL — HIGH (ref 7–23)
CALCIUM SERPL-MCNC: 9.7 MG/DL — SIGNIFICANT CHANGE UP (ref 8.4–10.5)
CHLORIDE SERPL-SCNC: 106 MMOL/L — SIGNIFICANT CHANGE UP (ref 96–108)
CO2 SERPL-SCNC: 29 MMOL/L — SIGNIFICANT CHANGE UP (ref 22–31)
COLOR SPEC: SIGNIFICANT CHANGE UP
CREAT SERPL-MCNC: 1.26 MG/DL — SIGNIFICANT CHANGE UP (ref 0.5–1.3)
DIFF PNL FLD: NEGATIVE — SIGNIFICANT CHANGE UP
EGFR: 39 ML/MIN/1.73M2 — LOW
EGFR: 39 ML/MIN/1.73M2 — LOW
EOSINOPHIL # BLD AUTO: 0.26 K/UL — SIGNIFICANT CHANGE UP (ref 0–0.5)
EOSINOPHIL NFR BLD AUTO: 2.1 % — SIGNIFICANT CHANGE UP (ref 0–6)
EPI CELLS # UR: PRESENT
GLUCOSE SERPL-MCNC: 138 MG/DL — HIGH (ref 70–99)
GLUCOSE UR QL: NEGATIVE MG/DL — SIGNIFICANT CHANGE UP
HCT VFR BLD CALC: 26.6 % — LOW (ref 34.5–45)
HGB BLD-MCNC: 8.8 G/DL — LOW (ref 11.5–15.5)
IMM GRANULOCYTES NFR BLD AUTO: 0.3 % — SIGNIFICANT CHANGE UP (ref 0–0.9)
INR BLD: 1.95 RATIO — HIGH (ref 0.85–1.16)
KETONES UR-MCNC: ABNORMAL MG/DL
LEUKOCYTE ESTERASE UR-ACNC: ABNORMAL
LYMPHOCYTES # BLD AUTO: 1.02 K/UL — SIGNIFICANT CHANGE UP (ref 1–3.3)
LYMPHOCYTES # BLD AUTO: 8.3 % — LOW (ref 13–44)
MCHC RBC-ENTMCNC: 33.1 G/DL — SIGNIFICANT CHANGE UP (ref 32–36)
MCHC RBC-ENTMCNC: 34.6 PG — HIGH (ref 27–34)
MCV RBC AUTO: 104.7 FL — HIGH (ref 80–100)
MONOCYTES # BLD AUTO: 1.63 K/UL — HIGH (ref 0–0.9)
MONOCYTES NFR BLD AUTO: 13.3 % — SIGNIFICANT CHANGE UP (ref 2–14)
NEUTROPHILS # BLD AUTO: 9.24 K/UL — HIGH (ref 1.8–7.4)
NEUTROPHILS NFR BLD AUTO: 75.8 % — SIGNIFICANT CHANGE UP (ref 43–77)
NITRITE UR-MCNC: NEGATIVE — SIGNIFICANT CHANGE UP
NRBC BLD AUTO-RTO: 0 /100 WBCS — SIGNIFICANT CHANGE UP (ref 0–0)
PH UR: 5 — SIGNIFICANT CHANGE UP (ref 5–8)
PLATELET # BLD AUTO: 168 K/UL — SIGNIFICANT CHANGE UP (ref 150–400)
POTASSIUM SERPL-MCNC: 4.2 MMOL/L — SIGNIFICANT CHANGE UP (ref 3.5–5.3)
POTASSIUM SERPL-SCNC: 4.2 MMOL/L — SIGNIFICANT CHANGE UP (ref 3.5–5.3)
PROT SERPL-MCNC: 6.2 G/DL — SIGNIFICANT CHANGE UP (ref 6–8.3)
PROT UR-MCNC: 30 MG/DL
PROTHROM AB SERPL-ACNC: 22.9 SEC — HIGH (ref 9.9–13.4)
RBC # BLD: 2.54 M/UL — LOW (ref 3.8–5.2)
RBC # FLD: 13.7 % — SIGNIFICANT CHANGE UP (ref 10.3–14.5)
RBC CASTS # UR COMP ASSIST: 0 /HPF — SIGNIFICANT CHANGE UP (ref 0–4)
SODIUM SERPL-SCNC: 143 MMOL/L — SIGNIFICANT CHANGE UP (ref 135–145)
SP GR SPEC: 1.03 — SIGNIFICANT CHANGE UP (ref 1–1.03)
TROPONIN I, HIGH SENSITIVITY RESULT: 12.2 NG/L — SIGNIFICANT CHANGE UP
UROBILINOGEN FLD QL: 1 MG/DL — SIGNIFICANT CHANGE UP (ref 0.2–1)
WBC # BLD: 12.43 K/UL — HIGH (ref 3.8–10.5)
WBC # FLD AUTO: 12.43 K/UL — HIGH (ref 3.8–10.5)
WBC UR QL: 23 /HPF — HIGH (ref 0–5)

## 2025-05-11 PROCEDURE — 93010 ELECTROCARDIOGRAM REPORT: CPT

## 2025-05-11 PROCEDURE — 73552 X-RAY EXAM OF FEMUR 2/>: CPT | Mod: 26,RT

## 2025-05-11 PROCEDURE — 99223 1ST HOSP IP/OBS HIGH 75: CPT

## 2025-05-11 PROCEDURE — 73562 X-RAY EXAM OF KNEE 3: CPT | Mod: 26,RT

## 2025-05-11 PROCEDURE — 70450 CT HEAD/BRAIN W/O DYE: CPT | Mod: 26

## 2025-05-11 PROCEDURE — 71045 X-RAY EXAM CHEST 1 VIEW: CPT | Mod: 26

## 2025-05-11 PROCEDURE — 99497 ADVNCD CARE PLAN 30 MIN: CPT | Mod: 25

## 2025-05-11 PROCEDURE — 72192 CT PELVIS W/O DYE: CPT | Mod: 26

## 2025-05-11 PROCEDURE — 72125 CT NECK SPINE W/O DYE: CPT | Mod: 26

## 2025-05-11 PROCEDURE — 99285 EMERGENCY DEPT VISIT HI MDM: CPT

## 2025-05-11 RX ORDER — MELATONIN 5 MG
3 TABLET ORAL AT BEDTIME
Refills: 0 | Status: DISCONTINUED | OUTPATIENT
Start: 2025-05-11 | End: 2025-05-16

## 2025-05-11 RX ORDER — ACETAMINOPHEN 500 MG/5ML
975 LIQUID (ML) ORAL ONCE
Refills: 0 | Status: COMPLETED | OUTPATIENT
Start: 2025-05-11 | End: 2025-05-11

## 2025-05-11 RX ORDER — MAGNESIUM, ALUMINUM HYDROXIDE 200-200 MG
30 TABLET,CHEWABLE ORAL EVERY 4 HOURS
Refills: 0 | Status: DISCONTINUED | OUTPATIENT
Start: 2025-05-11 | End: 2025-05-16

## 2025-05-11 RX ORDER — TRAMADOL HYDROCHLORIDE 50 MG/1
25 TABLET, FILM COATED ORAL EVERY 6 HOURS
Refills: 0 | Status: DISCONTINUED | OUTPATIENT
Start: 2025-05-11 | End: 2025-05-16

## 2025-05-11 RX ORDER — ACETAMINOPHEN 500 MG/5ML
650 LIQUID (ML) ORAL EVERY 6 HOURS
Refills: 0 | Status: COMPLETED | OUTPATIENT
Start: 2025-05-11 | End: 2025-05-14

## 2025-05-11 RX ORDER — B1/B2/B3/B5/B6/B12/VIT C/FOLIC 500-0.5 MG
1 TABLET ORAL DAILY
Refills: 0 | Status: DISCONTINUED | OUTPATIENT
Start: 2025-05-12 | End: 2025-05-16

## 2025-05-11 RX ORDER — LACTOBACILLUS ACIDOPHILUS/PECT 75 MM-100
1 CAPSULE ORAL
Refills: 0 | Status: DISCONTINUED | OUTPATIENT
Start: 2025-05-12 | End: 2025-05-16

## 2025-05-11 RX ORDER — TRAMADOL HYDROCHLORIDE 50 MG/1
50 TABLET, FILM COATED ORAL EVERY 8 HOURS
Refills: 0 | Status: DISCONTINUED | OUTPATIENT
Start: 2025-05-11 | End: 2025-05-16

## 2025-05-11 RX ORDER — ONDANSETRON HCL/PF 4 MG/2 ML
4 VIAL (ML) INJECTION EVERY 8 HOURS
Refills: 0 | Status: DISCONTINUED | OUTPATIENT
Start: 2025-05-11 | End: 2025-05-16

## 2025-05-11 RX ORDER — ATORVASTATIN CALCIUM 80 MG/1
40 TABLET, FILM COATED ORAL AT BEDTIME
Refills: 0 | Status: DISCONTINUED | OUTPATIENT
Start: 2025-05-11 | End: 2025-05-16

## 2025-05-11 RX ORDER — DILTIAZEM HYDROCHLORIDE 120 MG/1
180 CAPSULE, EXTENDED RELEASE ORAL DAILY
Refills: 0 | Status: DISCONTINUED | OUTPATIENT
Start: 2025-05-11 | End: 2025-05-16

## 2025-05-11 RX ORDER — POLYETHYLENE GLYCOL 3350 17 G/17G
17 POWDER, FOR SOLUTION ORAL AT BEDTIME
Refills: 0 | Status: DISCONTINUED | OUTPATIENT
Start: 2025-05-11 | End: 2025-05-16

## 2025-05-11 RX ORDER — APIXABAN 2.5 MG/1
2.5 TABLET, FILM COATED ORAL
Refills: 0 | Status: DISCONTINUED | OUTPATIENT
Start: 2025-05-11 | End: 2025-05-11

## 2025-05-11 RX ORDER — DORZOLAMIDE HYDROCHLORIDE AND TIMOLOL MALEATE 20; 5 MG/ML; MG/ML
1 SOLUTION/ DROPS OPHTHALMIC
Refills: 0 | Status: DISCONTINUED | OUTPATIENT
Start: 2025-05-11 | End: 2025-05-16

## 2025-05-11 RX ADMIN — Medication 50 MILLILITER(S): at 18:51

## 2025-05-11 RX ADMIN — DORZOLAMIDE HYDROCHLORIDE AND TIMOLOL MALEATE 1 DROP(S): 20; 5 SOLUTION/ DROPS OPHTHALMIC at 20:30

## 2025-05-11 RX ADMIN — Medication 500 MILLILITER(S): at 16:06

## 2025-05-11 RX ADMIN — Medication 650 MILLIGRAM(S): at 20:38

## 2025-05-11 RX ADMIN — Medication 1000 MILLILITER(S): at 15:06

## 2025-05-11 RX ADMIN — ATORVASTATIN CALCIUM 40 MILLIGRAM(S): 80 TABLET, FILM COATED ORAL at 22:12

## 2025-05-11 RX ADMIN — POLYETHYLENE GLYCOL 3350 17 GRAM(S): 17 POWDER, FOR SOLUTION ORAL at 22:12

## 2025-05-11 RX ADMIN — Medication 975 MILLIGRAM(S): at 15:36

## 2025-05-11 RX ADMIN — Medication 40 MILLIGRAM(S): at 18:51

## 2025-05-11 RX ADMIN — Medication 650 MILLIGRAM(S): at 20:08

## 2025-05-11 RX ADMIN — Medication 975 MILLIGRAM(S): at 15:06

## 2025-05-11 NOTE — ED PROVIDER NOTE - PHYSICAL EXAMINATION
VITAL SIGNS: I have reviewed nursing notes and confirm.  CONSTITUTIONAL: well-appearing, non-toxic, NAD  SKIN: Warm dry, normal skin turgor  HEAD: NCAT  CARD: RRR, no murmurs, rubs or gallops  RESP: clear to ausculation b/l.  No rales, rhonchi, or wheezing.  ABD: soft, + BS, non-tender, non-distended, no rebound or guarding. No CVA tenderness  EXT: b/l knee ttp, no LE edema   NEURO: normal motor. normal sensory. no facial droop, no slurred speech   PSYCH: Cooperative, appropriate.

## 2025-05-11 NOTE — H&P ADULT - NSHPREVIEWOFSYSTEMS_GEN_ALL_CORE
REVIEW OF SYSTEMS:  CONSTITUTIONAL: No fever, weight loss, or fatigue  EYES: No eye pain, visual disturbances, or discharge  ENMT:  +[place], no tinnitus, vertigo; No sinus or throat pain  NECK: No neck pain or neck stiffness  RESPIRATORY: No cough, wheezing, chills or hemoptysis; No shortness of breath  CARDIOVASCULAR: No chest pain, palpitations, dizziness, or leg swelling  GASTROINTESTINAL: No abdominal pain, No nausea, vomiting, or hematemesis; No diarrhea or constipation  GENITOURINARY: No dysuria, frequency, hematuria, or incontinence  NEUROLOGICAL: No headaches, memory loss, loss of strength, numbness, or tremors  SKIN: No itching, burning, rashes, or lesions   LYMPH NODES: No enlarged glands  MUSCULOSKELETAL: No back pain, endorses right knee pain   PSYCHIATRIC: No depression, anxiety, mood swings, or difficulty sleeping    All other ROS reviewed and negative except as otherwise stated

## 2025-05-11 NOTE — H&P ADULT - CONVERSATION DETAILS
Son Mariano at bedside stated patient has previous DNR/DNI order which is physically with his sister. Richie HERNANDEZ filled out, patient DNR/DNI.

## 2025-05-11 NOTE — H&P ADULT - NSHPLABSRESULTS_GEN_ALL_CORE
8.8    12.43 )-----------( 168      ( 11 May 2025 15:10 )             26.6       05-11    143  |  106  |  31[H]  ----------------------------<  138[H]  4.2   |  29  |  1.26    Ca    9.7      11 May 2025 15:10    TPro  6.2  /  Alb  3.1[L]  /  TBili  0.8  /  DBili  x   /  AST  27  /  ALT  25  /  AlkPhos  70  05-11              Urinalysis Basic - ( 11 May 2025 15:10 )    Color: x / Appearance: x / SG: x / pH: x  Gluc: 138 mg/dL / Ketone: x  / Bili: x / Urobili: x   Blood: x / Protein: x / Nitrite: x   Leuk Esterase: x / RBC: x / WBC x   Sq Epi: x / Non Sq Epi: x / Bacteria: x        PT/INR - ( 11 May 2025 15:10 )   PT: 22.9 sec;   INR: 1.95 ratio         PTT - ( 11 May 2025 15:10 )  PTT:35.4 sec    Lactate Trend            CAPILLARY BLOOD GLUCOSE            C< from: CT Pelvis Bony Only No Cont (05.11.25 @ 15:07) >    IMPRESSION:  1.  No fractures are seen.  2.  Severe right hip arthrosis again seen.  3.  Mild edema around the right rectus femoris muscle could reflect   muscle strain.    < end of copied text >    < from: CT Head No Cont (05.11.25 @ 15:06) >    IMPRESSION:  HEAD CT:  No evidence of an acute traumatic intracranial injury.  CERVICAL SPINE CT:  No evidence of an acute cervical spine fracture.    --- End of Report ---    < end of copied text >

## 2025-05-11 NOTE — ED ADULT NURSE REASSESSMENT NOTE - NS ED NURSE REASSESS COMMENT FT1
Patient received to previous RN. Patient sleeping comfortably in hospital bed at this time. Vitals as per flow sheet. Safety maintained. Comfort measures provided.

## 2025-05-11 NOTE — ED ADULT NURSE NOTE - NSICDXPASTMEDICALHX_GEN_ALL_CORE_FT
PAST MEDICAL HISTORY:  Fall     GERD (gastroesophageal reflux disease)     Glaucoma     History of osteoarthritis     Hypertension     MVP (mitral valve prolapse) possibly hx leaky valve

## 2025-05-11 NOTE — ED ADULT NURSE NOTE - NSFALLHARMRISKINTERV_ED_ALL_ED

## 2025-05-11 NOTE — ED PROVIDER NOTE - CLINICAL SUMMARY MEDICAL DECISION MAKING FREE TEXT BOX
89-year-old female with history of A-fib on Eliquis, OA, hypertension, MVP, GERD presenting to the ED status post unwitnessed fall yesterday reports fall onto right knee, denies any associated head injury, patient reportedly more confused but no at her baseline mental status, has history of UTIs in the past, patient otherwise is afebrile, pending urinalysis, on Prima fit, CT head C-spine negative for traumatic pathology, CT bony pelvis with no noted fractures, noted rectus femoris edema, possible muscle strain, x-ray of right femur and knee negative for acute fracture, patient failed ambulation trial will admit to medicine

## 2025-05-11 NOTE — H&P ADULT - HISTORY OF PRESENT ILLNESS
89 year old female with PMHx HTN, HLD, Afib on eliquis, MVP, and arthritis presents to ED s/p fall at home. Patient stated she started to feel weak and pain in her right knee, so she guided herself onto the floor. Patient's aide was present 15 minutes later to assist patient. Patient denies head strike, denies LOC. Patient has onlytaken 3 Tylenol for right knee pain.  89 year old female with PMHx HTN, HLD, Afib on eliquis, MVP, and arthritis presents to ED s/p fall at home. Patient stated she started to feel weak and pain in her right knee, so she guided herself onto the floor. Patient's aide was present 15 minutes later to assist patient up. Patient denies head strike, denies LOC. Patient endorses right knee pain which she has taken 3 Tylenol for today. Son at the bedside states patient is more confused than usual, suspects a urinary tract infection. Patient is otherwise independent living alone with help of an aide. Patient denies headache, nausea/vomiting, abdominal pain. Patient admitted for ambulatory dysfunction, pain management.

## 2025-05-11 NOTE — H&P ADULT - NSHPPHYSICALEXAM_GEN_ALL_CORE
Vital Signs Last 24 Hrs  T(F): 97.7 (11 May 2025 14:42), Max: 97.7 (11 May 2025 14:42)  HR: 71 (11 May 2025 14:42) (71 - 71)  BP: 127/66 (11 May 2025 14:42) (127/66 - 127/66)  RR: 18 (11 May 2025 14:42) (18 - 18)  SpO2: 96% (11 May 2025 14:42) (96% - 96%)    PHYSICAL EXAM:  GENERAL: NAD, well-groomed, well-developed  HEAD:  Atraumatic, Normocephalic  EYES: EOMI, conjunctiva and sclera clear  ENMT: Moist mucous membranes, Good dentition, no thrush  NECK: Supple, No JVD  CHEST/LUNG: Clear to auscultation bilaterally, good air entry, non-labored breathing  HEART: RRR; S1/S2, No murmur  ABDOMEN: Soft, Nontender, Nondistended; Bowel sounds present  VASCULAR: Normal pulses, Normal capillary refill  EXTREMITIES: No calf tenderness, No cyanosis, No edema, mild swelling of right knee    LYMPH: Normal; No lymphadenopathy noted  SKIN: Warm, Intact  PSYCH: Normal mood, Normal affect  NERVOUS SYSTEM:  A/O x3, Good concentration; CN 2-12 intact, No focal deficits, [place]

## 2025-05-11 NOTE — ED ADULT NURSE NOTE - OBJECTIVE STATEMENT
Pt BIB EMS from home s/p fall yesterday with c/o right knee pain. Pt with hx HTN, Afib (on eliquis) and OA. Per family, pt has also been more confused recently - family stating they are concerned for a UTI. Pt BIB EMS from home s/p fall yesterday with c/o right knee pain. Denies head injury or LOC. (+) Eliquis use, Pt with hx HTN, Afib  and OA. Per family, pt has also been more confused recently - family stating they are concerned for a UTI.

## 2025-05-11 NOTE — ED ADULT TRIAGE NOTE - CHIEF COMPLAINT QUOTE
Patient brought in by EMS from home with complaint of unwitnessed fall yesterday. Patient not sure if she hit her head. Patient is on Eliquis. Patient is R knee pain. Per family, patient has been more confused, concern for UTI

## 2025-05-11 NOTE — H&P ADULT - ASSESSMENT
89 year old female with PMHx HTN, HLD, Afib on eliquis, MVP, and arthritis presents to ED s/p fall at home. Patient admitted with ambulatory dysfunction, right knee pain, R/o UTI.     #s/p fall, ambulatory dysfunction   #Right knee pain   #Leukocytosis   - CTH negative   - CT pelvis no fractures, mild edema could represent muscle strain  - XR R Femur, Knee follow up   - UA ordered, pending collection   - continue pain management, PRN tramadol   - bowel regimen  - follow f/wbc  - PT/OT ordered    #Afib; chronic  #HTN, HLD  - on home Cartia XT 180mg ER, continue Diltiazem   - continue eliquis 2.5mg BID per pharm recs (at home takes 5mg BID)   - on home amlodipine-besy-benazepril 5-10mg PRN, held for now   - monitor BP, give BP medication as needed   - continue statin     #Glaucoma  - continue Dorzolamide eye drops     #GERD  - continue protonix in house     #GOC - per son Mariano at bedside, DNR/DNI. New MOLST completed at bedside     #DVTppx  - eliquis      Dispo: admitted for ambulatory dysfunction pending UA, XR right knee. Patient and family updated and in agreement    IMPROVE VTE Individual Risk Assessment          RISK                                                          Points  [  ] Previous VTE                                                3  [  ] Thrombophilia                                             2  [  ] Lower limb paralysis                                   2        (unable to hold up >15 seconds)    [  ] Current Cancer                                             2         (within 6 months)  [  ] Immobilization > 24 hrs                              1  [  ] ICU/CCU stay > 24 hours                             1  [ x ] Age > 60                                                         1    IMPROVE VTE Score: 1      89 year old female with PMHx HTN, HLD, Afib on eliquis, MVP, and arthritis presents to ED s/p fall at home. Patient admitted with ambulatory dysfunction, right knee pain, R/o UTI.     #s/p fall, ambulatory dysfunction   #Right knee pain   #Leukocytosis   - CTH negative   - CT pelvis no fractures, mild edema could represent muscle strain  - XR R Femur, Knee follow up   - UA ordered, pending collection   - continue pain management, PRN tramadol   - bowel regimen  - follow f/wbc  - PT/OT ordered  - fall precautions    #Afib; chronic  #HTN, HLD  - on home Cartia XT 180mg ER, continue Diltiazem   - eliquis 2.5mg BID per pharm recs (at home takes 5mg BID) : held for anemia. resume if h/h stable  - on home amlodipine-besy-benazepril 5-10mg PRN, held for now   - monitor BP, give BP medication as needed . check OH  - continue statin     # Anemia  - hb on dec 2024 12. now 8.8  - no apparent ssx of bleeding  - hold eliquis for now  - check fobt  - check h/h in am. resume if stable and no bleeding      #Glaucoma  - continue Dorzolamide eye drops     #GERD  - continue protonix in house     #GOC - per son Mariano at bedside, DNR/DNI. New MOLST completed at bedside     #DVTppx  - eliquis      Dispo: admitted for ambulatory dysfunction pending UA, XR right knee. Patient and family updated and in agreement    IMPROVE VTE Individual Risk Assessment          RISK                                                          Points  [  ] Previous VTE                                                3  [  ] Thrombophilia                                             2  [  ] Lower limb paralysis                                   2        (unable to hold up >15 seconds)    [  ] Current Cancer                                             2         (within 6 months)  [  ] Immobilization > 24 hrs                              1  [  ] ICU/CCU stay > 24 hours                             1  [ x ] Age > 60                                                         1    IMPROVE VTE Score: 1      89 year old female with PMHx HTN, HLD, Afib on eliquis, MVP, and arthritis presents to ED s/p fall at home. Patient admitted with ambulatory dysfunction, right knee pain, R/o UTI.     #s/p fall, ambulatory dysfunction   #Right knee pain   #Leukocytosis   - CTH negative   - CT pelvis no fractures, mild edema could represent muscle strain  - XR R Femur, Knee follow up   - UA ordered, pending collection   - continue pain management, PRN tramadol   - bowel regimen  - follow f/wbc  - PT/OT ordered  - fall precautions    # EDGAR  - likely due to dehydration. r/o UTI  - NS@50 mls/hr. stop after one liter  - I and Os  - bladder scan Q6hrs. SC if PVR>350 mls  - f/u UA    #Afib; chronic  #HTN, HLD  - on home Cartia XT 180mg ER, continue Diltiazem   - eliquis 2.5mg BID per pharm recs (at home takes 5mg BID) : held for anemia. resume if h/h stable  - on home amlodipine-besy-benazepril 5-10mg PRN, held for now   - monitor BP, give BP medication as needed . check OH  - continue statin     # Anemia  - hb on dec 2024 12. now 8.8  - no apparent ssx of bleeding  - hold eliquis for now  - check fobt  - check h/h in am. resume if stable and no bleeding      #Glaucoma  - continue Dorzolamide eye drops     #GERD  - continue protonix in house     #GOC - per son Mariano at bedside, DNR/DNI. New MOLST completed at bedside     #DVTppx  - eliquis      Dispo: admitted for ambulatory dysfunction pending UA, XR right knee. Patient and family updated and in agreement    IMPROVE VTE Individual Risk Assessment          RISK                                                          Points  [  ] Previous VTE                                                3  [  ] Thrombophilia                                             2  [  ] Lower limb paralysis                                   2        (unable to hold up >15 seconds)    [  ] Current Cancer                                             2         (within 6 months)  [  ] Immobilization > 24 hrs                              1  [  ] ICU/CCU stay > 24 hours                             1  [ x ] Age > 60                                                         1    IMPROVE VTE Score: 1

## 2025-05-12 LAB
ANION GAP SERPL CALC-SCNC: 9 MMOL/L — SIGNIFICANT CHANGE UP (ref 5–17)
BUN SERPL-MCNC: 26 MG/DL — HIGH (ref 7–23)
CALCIUM SERPL-MCNC: 9.3 MG/DL — SIGNIFICANT CHANGE UP (ref 8.4–10.5)
CHLORIDE SERPL-SCNC: 108 MMOL/L — SIGNIFICANT CHANGE UP (ref 96–108)
CO2 SERPL-SCNC: 25 MMOL/L — SIGNIFICANT CHANGE UP (ref 22–31)
CREAT SERPL-MCNC: 1.14 MG/DL — SIGNIFICANT CHANGE UP (ref 0.5–1.3)
EGFR: 44 ML/MIN/1.73M2 — LOW
EGFR: 44 ML/MIN/1.73M2 — LOW
GLUCOSE SERPL-MCNC: 104 MG/DL — HIGH (ref 70–99)
HCT VFR BLD CALC: 26.3 % — LOW (ref 34.5–45)
HGB BLD-MCNC: 8.5 G/DL — LOW (ref 11.5–15.5)
INR BLD: 1.45 RATIO — HIGH (ref 0.85–1.16)
MCHC RBC-ENTMCNC: 32.3 G/DL — SIGNIFICANT CHANGE UP (ref 32–36)
MCHC RBC-ENTMCNC: 34 PG — SIGNIFICANT CHANGE UP (ref 27–34)
MCV RBC AUTO: 105.2 FL — HIGH (ref 80–100)
NRBC BLD AUTO-RTO: 0 /100 WBCS — SIGNIFICANT CHANGE UP (ref 0–0)
PLATELET # BLD AUTO: 133 K/UL — LOW (ref 150–400)
POTASSIUM SERPL-MCNC: 3.6 MMOL/L — SIGNIFICANT CHANGE UP (ref 3.5–5.3)
POTASSIUM SERPL-SCNC: 3.6 MMOL/L — SIGNIFICANT CHANGE UP (ref 3.5–5.3)
PROTHROM AB SERPL-ACNC: 17 SEC — HIGH (ref 9.9–13.4)
RBC # BLD: 2.5 M/UL — LOW (ref 3.8–5.2)
RBC # FLD: 13.8 % — SIGNIFICANT CHANGE UP (ref 10.3–14.5)
SODIUM SERPL-SCNC: 142 MMOL/L — SIGNIFICANT CHANGE UP (ref 135–145)
WBC # BLD: 8.85 K/UL — SIGNIFICANT CHANGE UP (ref 3.8–10.5)
WBC # FLD AUTO: 8.85 K/UL — SIGNIFICANT CHANGE UP (ref 3.8–10.5)

## 2025-05-12 PROCEDURE — 76376 3D RENDER W/INTRP POSTPROCES: CPT | Mod: 26

## 2025-05-12 PROCEDURE — 99233 SBSQ HOSP IP/OBS HIGH 50: CPT

## 2025-05-12 PROCEDURE — 73700 CT LOWER EXTREMITY W/O DYE: CPT | Mod: 26,RT

## 2025-05-12 RX ORDER — CEFTRIAXONE 500 MG/1
1000 INJECTION, POWDER, FOR SOLUTION INTRAMUSCULAR; INTRAVENOUS EVERY 24 HOURS
Refills: 0 | Status: DISCONTINUED | OUTPATIENT
Start: 2025-05-12 | End: 2025-05-14

## 2025-05-12 RX ORDER — APIXABAN 2.5 MG/1
2.5 TABLET, FILM COATED ORAL EVERY 12 HOURS
Refills: 0 | Status: DISCONTINUED | OUTPATIENT
Start: 2025-05-12 | End: 2025-05-12

## 2025-05-12 RX ADMIN — Medication 650 MILLIGRAM(S): at 12:47

## 2025-05-12 RX ADMIN — POLYETHYLENE GLYCOL 3350 17 GRAM(S): 17 POWDER, FOR SOLUTION ORAL at 21:01

## 2025-05-12 RX ADMIN — Medication 650 MILLIGRAM(S): at 13:47

## 2025-05-12 RX ADMIN — CEFTRIAXONE 100 MILLIGRAM(S): 500 INJECTION, POWDER, FOR SOLUTION INTRAMUSCULAR; INTRAVENOUS at 08:40

## 2025-05-12 RX ADMIN — ATORVASTATIN CALCIUM 40 MILLIGRAM(S): 80 TABLET, FILM COATED ORAL at 21:01

## 2025-05-12 RX ADMIN — DORZOLAMIDE HYDROCHLORIDE AND TIMOLOL MALEATE 1 DROP(S): 20; 5 SOLUTION/ DROPS OPHTHALMIC at 21:01

## 2025-05-12 RX ADMIN — Medication 650 MILLIGRAM(S): at 00:08

## 2025-05-12 RX ADMIN — DILTIAZEM HYDROCHLORIDE 180 MILLIGRAM(S): 120 CAPSULE, EXTENDED RELEASE ORAL at 05:50

## 2025-05-12 RX ADMIN — Medication 1 TABLET(S): at 12:48

## 2025-05-12 RX ADMIN — DORZOLAMIDE HYDROCHLORIDE AND TIMOLOL MALEATE 1 DROP(S): 20; 5 SOLUTION/ DROPS OPHTHALMIC at 05:55

## 2025-05-12 RX ADMIN — Medication 650 MILLIGRAM(S): at 21:21

## 2025-05-12 RX ADMIN — Medication 1 TABLET(S): at 12:47

## 2025-05-12 RX ADMIN — Medication 1 DOSE(S): at 22:37

## 2025-05-12 RX ADMIN — TRAMADOL HYDROCHLORIDE 25 MILLIGRAM(S): 50 TABLET, FILM COATED ORAL at 16:28

## 2025-05-12 RX ADMIN — Medication 40 MILLIGRAM(S): at 05:49

## 2025-05-12 RX ADMIN — Medication 650 MILLIGRAM(S): at 21:01

## 2025-05-12 RX ADMIN — Medication 650 MILLIGRAM(S): at 05:50

## 2025-05-12 RX ADMIN — Medication 650 MILLIGRAM(S): at 00:38

## 2025-05-12 NOTE — OCCUPATIONAL THERAPY INITIAL EVALUATION ADULT - NSOTDMEREC_GEN_A_CORE
MIKE PAVITHRA    Patient Age: 72 year old   Interpreting service used: No    Insurance on file confirmed with caller: Yes    Patient seen within 1 year by a provider in primary care? Yes-      Refill to be: ePrescribed    Medication requested to be refilled: See Pended Medication    Does patient have enough to medication for 72 business hours? Yes-  Route message to providers clinical pool.    Caller informed to check with the pharmacy later for their refill.  If problems arise, we will contact patient.    Message read back to caller for accuracy: Yes     WEIGHT AND HEIGHT:   Wt Readings from Last 1 Encounters:   06/25/22 78.5 kg (173 lb)     Ht Readings from Last 1 Encounters:   06/25/22 5' 7\" (1.702 m)     BMI Readings from Last 1 Encounters:   06/25/22 27.10 kg/m²       ALLERGIES:  Glipizide er  Current Outpatient Medications   Medication Sig Dispense Refill   • enalapril (VASOTEC) 10 MG tablet TAKE 1 TABLET EVERY DAY 90 tablet 1   • HYDROcodone-acetaminophen (NORCO)  MG per tablet TAKE ONE TABLET BY MOUTH EVERY 4 TO 6 HOURS AS NEEDED FOR PAIN 90 tablet 0   • traMADol (ULTRAM) 50 MG tablet TAKE 1 TABLET EVERY 6 HOURS AS NEEDED FOR PAIN 90 tablet 0   • Insulin Pen Needle 31G X 6 MM Misc Use to inject insulin one time daily. Remove needle cover(s) to expose needle before injecting. 100 each 3   • fluticasone-salmeterol 250-50 MCG/ACT inhaler INHALE 1 PUFF INTO THE LUNGS 2 TIMES DAILY. 3 each 1   • FeroSul 325 (65 Fe) MG tablet TAKE 1 TABLET EVERY DAY BEFORE BREAKFAST 90 tablet 1   • fluticasone (FLONASE) 50 MCG/ACT nasal spray USE 1 SPRAY IN EACH NOSTRIL EVERY DAY 32 g 1   • hydroCHLOROthiazide (HYDRODIURIL) 25 MG tablet TAKE 1 TABLET EVERY DAY 90 tablet 1   • Alcohol Swabs (B-D SINGLE USE SWABS REGULAR) Pads TEST BLOOD SUGAR THREE TIMES DAILY AS DIRECTED 300 each 3   • methocarbamol (ROBAXIN) 750 MG tablet TAKE 1 TABLET THREE TIMES DAILY 270 tablet 1   • insulin glargine (Lantus SoloStar) 100 UNIT/ML  pen-injector INJECT 60 UNITS SUBCUTANEOUSLY NIGHTLY. PRIME 2 UNITS BEFORE EACH DOSE. 60 mL 3   • albuterol-ipratropium (Combivent Respimat) 100-20 MCG/ACT inhaler Inhale 1 puff into the lungs every 4 hours as needed for Shortness of Breath. 4 g 11   • albuterol 108 (90 Base) MCG/ACT inhaler Inhale 2 puffs into the lungs every 4 hours as needed for Shortness of Breath or Wheezing. 1 each 12   • metFORMIN (GLUCOPHAGE) 500 MG tablet TAKE 2 TABLETS TWICE DAILY AND TAKE 1 TABLET AT LUNCH 450 tablet 1   • promethazine-dextromethorphan (PROMETHAZINE-DM) 6.25-15 MG/5ML syrup Take 5 mLs by mouth 4 times daily as needed for Cough. 240 mL 0   • blood glucose (True Metrix Blood Glucose Test) test strip True Metrix Testing Strips Test blood sugar 3 times daily Diagnosis: E11.9 Insulin: No 300 strip 3   • naLOXone (NARCAN) 4 MG/0.1ML nasal spray Spray the content of 1 device into 1 nostril. Call 911. May repeat with 2nd device in alternate nostril if no response in 2-3 minutes. 2 each 1   • rosuvastatin (CRESTOR) 20 MG tablet Take 1 tablet by mouth daily. 90 tablet 1   • omeprazole (PrilOSEC) 20 MG capsule Take 1 capsule by mouth daily. 90 capsule 3   • sitaGLIPtin (Januvia) 100 MG tablet Take 1 tablet by mouth daily. 90 tablet 3   • Lancets Misc. Kit True Metrix Testing Lancets Test blood sugar 3 times daily Diagnosis: E11.9 Insulin: No 300 kit 3   • Blood Glucose Monitoring Suppl Device True Metrix Testing Meter Test blood sugar 3 times daily Diagnosis: E11.9 Insulin: No 1 each 0   • DISPENSE Solution to be used 3 times daily Diagnosis: E11.9 Insulin: No (True Metrix Meter) 1 Bottle 3   • Microlet Lancets Misc Test 3 times daily Dx E11.9 Insulin:No 300 each 5   • MULTIPLE VITAMIN PO Take  by mouth. With iron daily - Oral     • aspirin 81 MG tablet Take 81 mg by mouth daily.     • capsaicin (ZOSTRIX) 0.025 % cream Apply 1 application topically 3 times daily as needed.     • Cod Liver Oil Oil      • Glucosamine-Chondroitin  500-250 MG Cap      • senna (SENOKOT) 8.6 MG tablet Take  by mouth. Rare occasion     • sildenafil (VIAGRA) 50 MG tablet Take 50 mg by mouth. Take 1 Tab by mouth as needed for Erectile Dysfunction.     • vitamin E 100 units capsule        No current facility-administered medications for this visit.         CALL BACK INFO: Ok to leave response (including medical information) on answering machine        PCP: Landen Peterson MD         INS: Payor: [employer]IC[employer]AGE / Plan: [facility] [id number] / Product Type: [employer] MEDICARE ADVANTAGE   PATIENT ADDRESS:  [address]  [address]  [place] [address]   Pt already owns all equipment as stated above.

## 2025-05-12 NOTE — PATIENT PROFILE ADULT - FALL HARM RISK - RISK INTERVENTIONS

## 2025-05-12 NOTE — OCCUPATIONAL THERAPY INITIAL EVALUATION ADULT - GENERAL OBSERVATIONS, REHAB EVAL
Pt received seated edge of bed with PT-Didier present and private HHA present +primatfit AxOx2-3. Pt agreeable to initial OT evaluation without complaints. Pt reports moderate pain in right knee; unable to verbalize comparative number. Pt Maximal assist to bridgette socks and perform sit to stand x1. Pt moderate assist for sit to semi-supine x1. Pt left as found, NAD, all lines intact, all safety measures in place, all needs met.

## 2025-05-12 NOTE — PHYSICAL THERAPY INITIAL EVALUATION ADULT - RANGE OF MOTION EXAMINATION, REHAB EVAL
Right LE range of motion limited by pain and apprehension, WNL/Left UE ROM was WNL (within normal limits)/Right UE ROM was WNL (within normal limits)/Left LE ROM was WNL (within normal limits)/Right LE ROM was WNL (within normal limits)

## 2025-05-12 NOTE — OCCUPATIONAL THERAPY INITIAL EVALUATION ADULT - ADDITIONAL COMMENTS
Pt's HHA reports that pt has fluctuating needs for assistance but required Moderate assist for toileting, bathing, and LE dressing.  Pt's HHA reports pt owning rollator, transport w/c, RTS, GBs, and shower chair.

## 2025-05-12 NOTE — OCCUPATIONAL THERAPY INITIAL EVALUATION ADULT - NS ASR FOLLOW COMMAND OT EVAL
[place] limiting ability to follow commands/50% of the time/able to follow single-step instructions

## 2025-05-12 NOTE — OCCUPATIONAL THERAPY INITIAL EVALUATION ADULT - NSACTIVITYREC_GEN_A_OT
Pt transfers with Maximal assist x1+RW.     Pt would benefit from home OT services to return to PLOF. Pt transfers with Maximal assist x1+RW. Recommending 24/7 care at d/c.    Pt would benefit from home OT services to return to PLOF.

## 2025-05-12 NOTE — PHYSICAL THERAPY INITIAL EVALUATION ADULT - PERTINENT HX OF CURRENT PROBLEM, REHAB EVAL
89 year old female with PMHx HTN, HLD, Afib on eliquis, MVP, and arthritis presents to ED s/p fall at home. Patient admitted with ambulatory dysfunction, right knee pain, R/o UTI.

## 2025-05-12 NOTE — PHYSICAL THERAPY INITIAL EVALUATION ADULT - GENERAL OBSERVATIONS, REHAB EVAL
Supine in bed, AAOx3, hard of hearing, lines intact, in NAD, caregiver present Supine in bed, AAOx3, hard of hearing, lines intact, in NAD, caregiver present. Cleared for PT eval, X-ray negative for fx.

## 2025-05-12 NOTE — PHYSICAL THERAPY INITIAL EVALUATION ADULT - ADDITIONAL COMMENTS
Pt lives in a private home, 4 steps to enter with railing, first floor set up. Has a private HHA as well as multiple children involved in her care. Requires assist for ADLs and IADLs. Uses a RW for indoor mobility and a WC for community mobility. At baseline pt ambulates without physical assist.

## 2025-05-12 NOTE — CONSULT NOTE ADULT - SUBJECTIVE AND OBJECTIVE BOX
ORTHOPEADIC SURGERY CONSULT NOTE    89F w/ PMHx HTN, HLD, Afib on eliquis, MVP, and arthritis presents to ED s/p fall at home on Friday c/o right knee pain. Per son Mariano at bedside pt began to feel week and guided herself to the floor, aide helped her up 15 minutes later. Son states patient is more confused than usual, her pain has increased since friday and her right knee has become more bruised.  Patient is otherwise independent living alone with help of an aide, uses RW.       HPI:  89 year old female with PMHx HTN, HLD, Afib on eliquis, MVP, and arthritis presents to ED s/p fall at home. Patient stated she started to feel weak and pain in her right knee, so she guided herself onto the floor. Patient's aide was present 15 minutes later to assist patient up. Patient denies head strike, denies LOC. Patient endorses right knee pain which she has taken 3 Tylenol for today. Son at the bedside states patient is more confused than usual, suspects a urinary tract infection. Patient is otherwise independent living alone with help of an aide. Patient denies headache, nausea/vomiting, abdominal pain. Patient admitted for ambulatory dysfunction, pain management.  (11 May 2025 17:31)      PAST MEDICAL & SURGICAL HISTORY:  Hypertension      History of osteoarthritis      GERD (gastroesophageal reflux disease)      Glaucoma      MVP (mitral valve prolapse)  possibly hx leaky valve      Fall      S/P cholecystectomy      S/P hysterectomy  partial      S/P breast biopsy, right  benign      S/P appendectomy      S/P tonsillectomy      S/P bladder repair  prolapsed bladder and uterus  all done w hysterectomy          Review of Systems:  Contained within HPI.    MEDICATIONS  (STANDING):  acetaminophen     Tablet .. 650 milliGRAM(s) Oral every 6 hours  atorvastatin 40 milliGRAM(s) Oral at bedtime  cefTRIAXone   IVPB 1000 milliGRAM(s) IV Intermittent every 24 hours  diltiazem    milliGRAM(s) Oral daily  dorzolamide 2%/timolol 0.5% Ophthalmic Solution 1 Drop(s) Both EYES two times a day  fluticasone propionate/ salmeterol 100-50 MICROgram(s) Diskus 1 Dose(s) Inhalation two times a day  lactobacillus acidophilus 1 Tablet(s) Oral with lunch  multivitamin 1 Tablet(s) Oral daily  pantoprazole    Tablet 40 milliGRAM(s) Oral before breakfast  polyethylene glycol 3350 17 Gram(s) Oral at bedtime    MEDICATIONS  (PRN):  aluminum hydroxide/magnesium hydroxide/simethicone Suspension 30 milliLiter(s) Oral every 4 hours PRN Dyspepsia  melatonin 3 milliGRAM(s) Oral at bedtime PRN Insomnia  ondansetron Injectable 4 milliGRAM(s) IV Push every 8 hours PRN Nausea and/or Vomiting  traMADol 25 milliGRAM(s) Oral every 6 hours PRN Moderate Pain (4 - 6)  traMADol 50 milliGRAM(s) Oral every 8 hours PRN Severe Pain (7 - 10)      Allergies    No Known Allergies    Intolerances        SOCIAL HISTORY       FAMILY HISTORY:      Vital Signs Last 24 Hrs  T(C): 36.4 (12 May 2025 05:20), Max: 36.8 (11 May 2025 21:00)  T(F): 97.5 (12 May 2025 05:20), Max: 98.2 (11 May 2025 21:00)  HR: 86 (12 May 2025 09:07) (64 - 86)  BP: 158/98 (12 May 2025 05:20) (109/55 - 158/98)  BP(mean): --  RR: 16 (12 May 2025 05:20) (16 - 18)  SpO2: 95% (12 May 2025 09:07) (95% - 96%)    Parameters below as of 12 May 2025 09:07  Patient On (Oxygen Delivery Method): room air        Physical Exam:    General:  Appears stated age, well-groomed, well-nourished, no distress  Chest: Equal and bilateral chest rise and fall   Cardiovascular: Not tachycardic  Extremities: Right knee mild swelling, scant fluctuance noted, slightly warm to touch, noted bruising  Musculoskeletal:  normal strength        LABS:                        8.5    8.85  )-----------( 133      ( 12 May 2025 07:48 )             26.3     05-12    142  |  108  |  26[H]  ----------------------------<  104[H]  3.6   |  25  |  1.14    Ca    9.3      12 May 2025 07:48    TPro  6.2  /  Alb  3.1[L]  /  TBili  0.8  /  DBili  x   /  AST  27  /  ALT  25  /  AlkPhos  70  05-11    PT/INR - ( 12 May 2025 07:48 )   PT: 17.0 sec;   INR: 1.45 ratio         PTT - ( 11 May 2025 15:10 )  PTT:35.4 sec  Urinalysis Basic - ( 12 May 2025 07:48 )    Color: x / Appearance: x / SG: x / pH: x  Gluc: 104 mg/dL / Ketone: x  / Bili: x / Urobili: x   Blood: x / Protein: x / Nitrite: x   Leuk Esterase: x / RBC: x / WBC x   Sq Epi: x / Non Sq Epi: x / Bacteria: x        RADIOLOGY & ADDITIONAL STUDIES:

## 2025-05-12 NOTE — PHYSICAL THERAPY INITIAL EVALUATION ADULT - FUNCTIONAL LIMITATIONS, PT EVAL
self-care Full Thickness Lip Wedge Repair (Flap) Text: Given the location of the defect and the proximity to free margins a full thickness wedge repair was deemed most appropriate.  Using a sterile surgical marker, the appropriate repair was drawn incorporating the defect and placing the expected incisions perpendicular to the vermilion border.  The vermilion border was also meticulously outlined to ensure appropriate reapproximation during the repair.  The area thus outlined was incised through and through with a #15 scalpel blade.  The muscularis and dermis were reaproximated with deep sutures following hemostasis. Care was taken to realign the vermilion border before proceeding with the superficial closure.  Once the vermilion was realigned the superfical and mucosal closure was finished.

## 2025-05-12 NOTE — CONSULT NOTE ADULT - ASSESSMENT
89F w/ PMHx HTN, HLD, Afib on eliquis, MVP, and arthritis presents to ED s/p fall at home on Friday c/o right knee pain. Per son Mariano at bedside pt began to feel week and guided herself to the floor, aide helped her up 15 minutes later. Son states patient is more confused than usual, her pain has increased since friday and her right knee has become more bruised.  Patient is otherwise independent living alone with help of an aide, uses RW.     Plan:  Admitted to the medical team  No surgical intervention at this time  NWB  Recommend knee immobilizer  Pending CT right knee read    Dr. Hamilton plans to call miracle Quintana once CT read is back to discuss the results, patient discussed with Dr. Hamilton     89F w/ PMHx HTN, HLD, Afib on eliquis, MVP, and arthritis presents to ED s/p fall at home on Friday c/o right knee pain. Per son Mariano at bedside pt began to feel week and guided herself to the floor, aide helped her up 15 minutes later. Son states patient is more confused than usual, her pain has increased since friday and her right knee has become more bruised.  Patient is otherwise independent living alone with help of an aide, uses RW.     Plan:  Admitted to the medical team  No surgical intervention at this time  NWB  Recommend knee immobilizer  Pending CT right knee read, looks via images to be serina prosthetic distal femur    Dr. Hamilton plans to call miracle Quintana once CT read is back to discuss the results, patient discussed with Dr. Hamilton

## 2025-05-12 NOTE — OCCUPATIONAL THERAPY INITIAL EVALUATION ADULT - PERTINENT HX OF CURRENT PROBLEM, REHAB EVAL
89 year old female with PMHx HTN, HLD, Afib on eliquis, MVP, and arthritis presents to ED s/p fall at home. Patient stated she started to feel weak and pain in her right knee, so she guided herself onto the floor. Patient's aide was present 15 minutes later to assist patient up. Patient denies head strike, denies LOC. Patient endorses right knee pain which she has taken 3 Tylenol for today. Son at the bedside states patient is more confused than usual, suspects a urinary tract infection. Patient is otherwise independent living alone with help of an aide. Patient denies headache, nausea/vomiting, abdominal pain. Patient admitted for ambulatory dysfunction, pain management.

## 2025-05-12 NOTE — PROGRESS NOTE ADULT - ASSESSMENT
89 year old female with PMHx HTN, HLD, Afib on eliquis, MVP, and arthritis presents to ED s/p fall at home. Patient admitted with ambulatory dysfunction, right knee pain, R/o UTI.     #s/p fall, ambulatory dysfunction   #Right knee pain   - CTH negative   - CT pelvis no fractures, mild edema could represent muscle strain  - XR R Femur, R Knee reviewed by me: with Moderate joint effusion in the suprapatellar bursa. Advanced degenerative change of the right       femur with  remodeling.  - continue pain management, PRN tramadol   -PT/OT ordered  - fall precautions  - Ortho consult    # Leukocytosis  - resolved today but U/A with possible UTI  - start empiric Rocephin  - Await urine culture    # EDGAR  - likely due to dehydration  - resolved  - continue to monitor renal function closely    #Afib; chronic  #HTN, HLD  - on home Cartia XT 180mg ER, continue Diltiazem   - hold off on restarting Eliquis as pt will be evaluated by ortho and might need R knee effusion drained. If no plans for procedure, will restart Eliquis  - on home amlodipine-benazepril 5-10mg PRN. Currently on hold. Will monitor BP and resume as needed  - continue statin     # Anemia  - hb on dec 2024 12. now 8.8  - no apparent ssx of bleeding  - H/H stble.   - hold Eliquis for now pending Ortho consult  -  fobt ordered      #Glaucoma  - continue Dorzolamide eye drops     #GERD  - continue protonix in house     #GOC -  DNR/DNI.     #DVTppx  - pt is low risk for DVT. Will resume Eliquis  if no procedure  planned    Dispo: admitted for ambulatory dysfunction pending UA, XR right knee. Patient and family updated and in agreement    IMPROVE VTE Individual Risk Assessment          RISK                                                          Points  [  ] Previous VTE                                                3  [  ] Thrombophilia                                             2  [  ] Lower limb paralysis                                   2        (unable to hold up >15 seconds)    [  ] Current Cancer                                             2         (within 6 months)  [  ] Immobilization > 24 hrs                              1  [  ] ICU/CCU stay > 24 hours                             1  [ x ] Age > 60                                                         1    IMPROVE VTE Score: 1     Plan discussed with patient's daughter Felipa over the phone

## 2025-05-12 NOTE — PROGRESS NOTE ADULT - SUBJECTIVE AND OBJECTIVE BOX
CC: Patient is a 89y old  Female who presents with a chief complaint of s/p fall (11 May 2025 17:31)      Interval History:  Patient seen and examined at bedside.  No overnight events  Denies CP, SOB, abd pain, N/V/D. Reports R knee pain    ALLERGIES:  No Known Allergies    MEDICATIONS  (STANDING):  acetaminophen     Tablet .. 650 milliGRAM(s) Oral every 6 hours  atorvastatin 40 milliGRAM(s) Oral at bedtime  cefTRIAXone   IVPB 1000 milliGRAM(s) IV Intermittent every 24 hours  diltiazem    milliGRAM(s) Oral daily  dorzolamide 2%/timolol 0.5% Ophthalmic Solution 1 Drop(s) Both EYES two times a day  fluticasone propionate/ salmeterol 100-50 MICROgram(s) Diskus 1 Dose(s) Inhalation two times a day  lactobacillus acidophilus 1 Tablet(s) Oral with lunch  multivitamin 1 Tablet(s) Oral daily  pantoprazole    Tablet 40 milliGRAM(s) Oral before breakfast  polyethylene glycol 3350 17 Gram(s) Oral at bedtime    MEDICATIONS  (PRN):  aluminum hydroxide/magnesium hydroxide/simethicone Suspension 30 milliLiter(s) Oral every 4 hours PRN Dyspepsia  melatonin 3 milliGRAM(s) Oral at bedtime PRN Insomnia  ondansetron Injectable 4 milliGRAM(s) IV Push every 8 hours PRN Nausea and/or Vomiting  traMADol 25 milliGRAM(s) Oral every 6 hours PRN Moderate Pain (4 - 6)  traMADol 50 milliGRAM(s) Oral every 8 hours PRN Severe Pain (7 - 10)    Vital Signs Last 24 Hrs  T(F): 97.5 (12 May 2025 05:20), Max: 98.2 (11 May 2025 21:00)  HR: 86 (12 May 2025 09:07) (64 - 86)  BP: 158/98 (12 May 2025 05:20) (109/55 - 158/98)  RR: 16 (12 May 2025 05:20) (16 - 18)  SpO2: 95% (12 May 2025 09:07) (95% - 96%)  I&O's Summary    BMI (kg/m2): 19.7 (05-11-25 @ 14:42)    PHYSICAL EXAM:  GENERAL: NAD, well-groomed, well-developed  HEAD:  Atraumatic, Normocephalic  EYES: EOMI, conjunctiva and sclera clear  NECK: Supple, No JVD  CHEST/LUNG: Clear to auscultation bilaterally, good air entry, non-labored breathing  HEART: RRR; S1/S2, No murmur  ABDOMEN: Soft, Nontender, Nondistended; Bowel sounds present  EXTREMITIES: No calf tenderness, No cyanosis, No edema,  swelling of right knee    SKIN: Warm, Intact  NERVOUS SYSTEM:  A/O x3, CN 2-12 intact, No focal deficits,       LABS:                        8.5    8.85  )-----------( 133      ( 12 May 2025 07:48 )             26.3       05-12    142  |  108  |  26  ----------------------------<  104  3.6   |  25  |  1.14    Ca    9.3      12 May 2025 07:48    TPro  6.2  /  Alb  3.1  /  TBili  0.8  /  DBili  x   /  AST  27  /  ALT  25  /  AlkPhos  70  05-11       PT/INR - ( 12 May 2025 07:48 )   PT: 17.0 sec;   INR: 1.45 ratio         PTT - ( 11 May 2025 15:10 )  PTT:35.4 sec     CARDIAC MARKERS ( 11 May 2025 15:10 )  x     / 12.2 ng/L / x     / x     / x                            Urinalysis Basic - ( 12 May 2025 07:48 )    Color: x / Appearance: x / SG: x / pH: x  Gluc: 104 mg/dL / Ketone: x  / Bili: x / Urobili: x   Blood: x / Protein: x / Nitrite: x   Leuk Esterase: x / RBC: x / WBC x   Sq Epi: x / Non Sq Epi: x / Bacteria: x            Care Discussed with Consultants/Other Providers: Yes. Ortho

## 2025-05-12 NOTE — OCCUPATIONAL THERAPY INITIAL EVALUATION ADULT - COORDINATION ASSESSED, REHAB EVAL
WFL. Difficulty following commands due to [place]/finger to nose WFL. Difficulty following commands due to [employer]/finger to nose

## 2025-05-12 NOTE — OCCUPATIONAL THERAPY INITIAL EVALUATION ADULT - MD ORDER
MD orders received, chart reviewed, contents noted. Pt cleared for OT initial evaluation by ASA Keenan. Refer below for findings.

## 2025-05-12 NOTE — CONSULT NOTE ADULT - NS ATTEND AMEND GEN_ALL_CORE FT
Patient seen and examined 5.13.25  Plan for closed treatment right distal femur minimally displaced periprosthetic fracture. Discussed and agreed to by family  Plan to convert KI to Renu brace in 2 weeks to allow ROM of knee  Daily Skin Checks  Formal consult dictated [id number]    DLS

## 2025-05-12 NOTE — CHART NOTE - NSCHARTNOTEFT_GEN_A_CORE
Pt seen at bedside with RN to fit patient with knee immobilizer. 24 inch was originally use bu patient c/o too long in the groin area.  Knee immobilizer replaced with 20 inch, much more comfortable.  Follow up if needed      [place]qu[employer] [place]  [phone]  [employer]

## 2025-05-13 LAB
ANION GAP SERPL CALC-SCNC: 8 MMOL/L — SIGNIFICANT CHANGE UP (ref 5–17)
BUN SERPL-MCNC: 27 MG/DL — HIGH (ref 7–23)
CALCIUM SERPL-MCNC: 9.2 MG/DL — SIGNIFICANT CHANGE UP (ref 8.4–10.5)
CHLORIDE SERPL-SCNC: 106 MMOL/L — SIGNIFICANT CHANGE UP (ref 96–108)
CO2 SERPL-SCNC: 25 MMOL/L — SIGNIFICANT CHANGE UP (ref 22–31)
CREAT SERPL-MCNC: 0.93 MG/DL — SIGNIFICANT CHANGE UP (ref 0.5–1.3)
EGFR: 56 ML/MIN/1.73M2 — LOW
EGFR: 56 ML/MIN/1.73M2 — LOW
GLUCOSE SERPL-MCNC: 103 MG/DL — HIGH (ref 70–99)
HCT VFR BLD CALC: 25.2 % — LOW (ref 34.5–45)
HGB BLD-MCNC: 8.3 G/DL — LOW (ref 11.5–15.5)
MAGNESIUM SERPL-MCNC: 1.7 MG/DL — SIGNIFICANT CHANGE UP (ref 1.6–2.6)
MCHC RBC-ENTMCNC: 32.9 G/DL — SIGNIFICANT CHANGE UP (ref 32–36)
MCHC RBC-ENTMCNC: 34.2 PG — HIGH (ref 27–34)
MCV RBC AUTO: 103.7 FL — HIGH (ref 80–100)
NRBC BLD AUTO-RTO: 0 /100 WBCS — SIGNIFICANT CHANGE UP (ref 0–0)
PLATELET # BLD AUTO: 149 K/UL — LOW (ref 150–400)
POTASSIUM SERPL-MCNC: 4 MMOL/L — SIGNIFICANT CHANGE UP (ref 3.5–5.3)
POTASSIUM SERPL-SCNC: 4 MMOL/L — SIGNIFICANT CHANGE UP (ref 3.5–5.3)
RBC # BLD: 2.43 M/UL — LOW (ref 3.8–5.2)
RBC # FLD: 13.8 % — SIGNIFICANT CHANGE UP (ref 10.3–14.5)
SODIUM SERPL-SCNC: 139 MMOL/L — SIGNIFICANT CHANGE UP (ref 135–145)
WBC # BLD: 10.79 K/UL — HIGH (ref 3.8–10.5)
WBC # FLD AUTO: 10.79 K/UL — HIGH (ref 3.8–10.5)

## 2025-05-13 PROCEDURE — 99232 SBSQ HOSP IP/OBS MODERATE 35: CPT

## 2025-05-13 RX ORDER — PHENAZOPYRIDINE HCL 100 MG
200 TABLET ORAL THREE TIMES A DAY
Refills: 0 | Status: DISCONTINUED | OUTPATIENT
Start: 2025-05-13 | End: 2025-05-13

## 2025-05-13 RX ADMIN — ATORVASTATIN CALCIUM 40 MILLIGRAM(S): 80 TABLET, FILM COATED ORAL at 21:04

## 2025-05-13 RX ADMIN — Medication 650 MILLIGRAM(S): at 11:28

## 2025-05-13 RX ADMIN — Medication 1 TABLET(S): at 11:28

## 2025-05-13 RX ADMIN — POLYETHYLENE GLYCOL 3350 17 GRAM(S): 17 POWDER, FOR SOLUTION ORAL at 21:05

## 2025-05-13 RX ADMIN — Medication 40 MILLIGRAM(S): at 08:33

## 2025-05-13 RX ADMIN — DORZOLAMIDE HYDROCHLORIDE AND TIMOLOL MALEATE 1 DROP(S): 20; 5 SOLUTION/ DROPS OPHTHALMIC at 17:01

## 2025-05-13 RX ADMIN — CEFTRIAXONE 100 MILLIGRAM(S): 500 INJECTION, POWDER, FOR SOLUTION INTRAMUSCULAR; INTRAVENOUS at 08:33

## 2025-05-13 RX ADMIN — Medication 650 MILLIGRAM(S): at 23:41

## 2025-05-13 RX ADMIN — Medication 3 MILLIGRAM(S): at 21:05

## 2025-05-13 RX ADMIN — Medication 650 MILLIGRAM(S): at 17:01

## 2025-05-13 RX ADMIN — TRAMADOL HYDROCHLORIDE 50 MILLIGRAM(S): 50 TABLET, FILM COATED ORAL at 21:05

## 2025-05-13 RX ADMIN — Medication 650 MILLIGRAM(S): at 17:31

## 2025-05-13 RX ADMIN — Medication 650 MILLIGRAM(S): at 23:11

## 2025-05-13 RX ADMIN — DORZOLAMIDE HYDROCHLORIDE AND TIMOLOL MALEATE 1 DROP(S): 20; 5 SOLUTION/ DROPS OPHTHALMIC at 05:26

## 2025-05-13 RX ADMIN — Medication 1 DOSE(S): at 09:31

## 2025-05-13 RX ADMIN — Medication 1 DOSE(S): at 22:10

## 2025-05-13 RX ADMIN — Medication 650 MILLIGRAM(S): at 05:27

## 2025-05-13 RX ADMIN — Medication 650 MILLIGRAM(S): at 11:58

## 2025-05-13 RX ADMIN — DILTIAZEM HYDROCHLORIDE 180 MILLIGRAM(S): 120 CAPSULE, EXTENDED RELEASE ORAL at 05:27

## 2025-05-13 RX ADMIN — TRAMADOL HYDROCHLORIDE 50 MILLIGRAM(S): 50 TABLET, FILM COATED ORAL at 21:35

## 2025-05-13 NOTE — PROGRESS NOTE ADULT - ASSESSMENT
89 year old female with PMHx HTN, HLD, Afib on eliquis, MVP, and arthritis presents to ED s/p fall at home. Patient admitted with ambulatory dysfunction, right knee pain, R/o UTI.     #s/p fall, ambulatory dysfunction   #Right knee pain   - CTH negative   - -ray, CT reviewed by Ortho - NWLILY RLE with immobilizer   - PT/OT ordered  - fall precautions    # UTI  - continue CTX  - follow culture    # EDGAR  - likely due to dehydration  - resolved  - continue to monitor renal function closely    #Afib; chronic  #HTN, HLD  - on home Cartia XT 180mg ER, continue Diltiazem   - Eliquis  - on home amlodipine-benazepril 5-10mg PRN. Currently on hold. Will monitor BP and resume as needed  - continue statin     # Anemia  - hb on dec 2024 12. now 8.3  - no apparent ssx of bleeding  - H/H stble.   - hold Eliquis for now pending Ortho consult  -  fobt ordered    #Glaucoma  - continue Dorzolamide eye drops     #GERD  - continue protonix in house     DVT Prophylaxis:  - Eliquis    #GOC -  DNR/DNI    Family wishes for patient to be home

## 2025-05-13 NOTE — PROGRESS NOTE ADULT - SUBJECTIVE AND OBJECTIVE BOX
Patient is a 89y old  Female who presents with a chief complaint of s/p fall (12 May 2025 12:54)    Patient seen and examined at bedside. No acute overnight events. Son and aide at bedside.     ALLERGIES:  No Known Allergies    MEDICATIONS  (STANDING):  acetaminophen     Tablet .. 650 milliGRAM(s) Oral every 6 hours  atorvastatin 40 milliGRAM(s) Oral at bedtime  cefTRIAXone   IVPB 1000 milliGRAM(s) IV Intermittent every 24 hours  diltiazem    milliGRAM(s) Oral daily  dorzolamide 2%/timolol 0.5% Ophthalmic Solution 1 Drop(s) Both EYES two times a day  fluticasone propionate/ salmeterol 100-50 MICROgram(s) Diskus 1 Dose(s) Inhalation two times a day  lactobacillus acidophilus 1 Tablet(s) Oral with lunch  multivitamin 1 Tablet(s) Oral daily  pantoprazole    Tablet 40 milliGRAM(s) Oral before breakfast  polyethylene glycol 3350 17 Gram(s) Oral at bedtime    MEDICATIONS  (PRN):  aluminum hydroxide/magnesium hydroxide/simethicone Suspension 30 milliLiter(s) Oral every 4 hours PRN Dyspepsia  melatonin 3 milliGRAM(s) Oral at bedtime PRN Insomnia  ondansetron Injectable 4 milliGRAM(s) IV Push every 8 hours PRN Nausea and/or Vomiting  traMADol 25 milliGRAM(s) Oral every 6 hours PRN Moderate Pain (4 - 6)  traMADol 50 milliGRAM(s) Oral every 8 hours PRN Severe Pain (7 - 10)    Vital Signs Last 24 Hrs  T(F): 97.3 (13 May 2025 14:44), Max: 98.2 (13 May 2025 05:10)  HR: 76 (13 May 2025 14:44) (76 - 97)  BP: 150/78 (13 May 2025 14:44) (144/73 - 157/70)  RR: 17 (13 May 2025 14:44) (17 - 18)  SpO2: 96% (13 May 2025 14:44) (94% - 97%)  I&O's Summary    12 May 2025 07:01  -  13 May 2025 07:00  --------------------------------------------------------  IN: 0 mL / OUT: 300 mL / NET: -300 mL    13 May 2025 07:01  -  13 May 2025 15:14  --------------------------------------------------------  IN: 500 mL / OUT: 0 mL / NET: 500 mL    BMI (kg/m2): 19.7 (05-11-25 @ 14:42)    PHYSICAL EXAM:  General: NAD, awake, alert, pleasant elderly F  ENT: MMM, no tonsilar exudate  Neck: Supple, No JVD  Lungs: Clear to auscultation bilaterally, no wheezes. Good air entry bilaterally   Cardio: RRR, S1/S2, No murmurs  Abdomen: Soft, Nontender, Nondistended; Bowel sounds present  Extremities: No calf tenderness, No pitting edema. R knee swelling, RLE in immobilizer     LABS:                        8.3    10.79 )-----------( 149      ( 13 May 2025 06:54 )             25.2       05-13    139  |  106  |  27  ----------------------------<  103  4.0   |  25  |  0.93    Ca    9.2      13 May 2025 06:54  Mg     1.7     05-13    TPro  6.2  /  Alb  3.1  /  TBili  0.8  /  DBili  x   /  AST  27  /  ALT  25  /  AlkPhos  70  05-11     PT/INR - ( 12 May 2025 07:48 )   PT: 17.0 sec;   INR: 1.45 ratio       PTT - ( 11 May 2025 15:10 )  PTT:35.4 sec     CARDIAC MARKERS ( 11 May 2025 15:10 )  x     / 12.2 ng/L / x     / x     / x        Urinalysis Basic - ( 13 May 2025 06:54 )    Color: x / Appearance: x / SG: x / pH: x  Gluc: 103 mg/dL / Ketone: x  / Bili: x / Urobili: x   Blood: x / Protein: x / Nitrite: x   Leuk Esterase: x / RBC: x / WBC x   Sq Epi: x / Non Sq Epi: x / Bacteria: x    Culture - Urine (collected 11 May 2025 19:05)  Source: Clean Catch None  Preliminary Report (13 May 2025 06:30):    >100,000 CFU/ml Escherichia coli    RADIOLOGY & ADDITIONAL TESTS:     Care Discussed with Consultants/Other Providers:

## 2025-05-14 LAB
-  AMOXICILLIN/CLAVULANIC ACID: SIGNIFICANT CHANGE UP
-  AMPICILLIN/SULBACTAM: SIGNIFICANT CHANGE UP
-  AMPICILLIN: SIGNIFICANT CHANGE UP
-  AZTREONAM: SIGNIFICANT CHANGE UP
-  CEFAZOLIN: SIGNIFICANT CHANGE UP
-  CEFEPIME: SIGNIFICANT CHANGE UP
-  CEFOXITIN: SIGNIFICANT CHANGE UP
-  CEFTRIAXONE: SIGNIFICANT CHANGE UP
-  CEFUROXIME: SIGNIFICANT CHANGE UP
-  CIPROFLOXACIN: SIGNIFICANT CHANGE UP
-  ERTAPENEM: SIGNIFICANT CHANGE UP
-  GENTAMICIN: SIGNIFICANT CHANGE UP
-  IMIPENEM: SIGNIFICANT CHANGE UP
-  LEVOFLOXACIN: SIGNIFICANT CHANGE UP
-  MEROPENEM: SIGNIFICANT CHANGE UP
-  NITROFURANTOIN: SIGNIFICANT CHANGE UP
-  PIPERACILLIN/TAZOBACTAM: SIGNIFICANT CHANGE UP
-  TIGECYCLINE: SIGNIFICANT CHANGE UP
-  TOBRAMYCIN: SIGNIFICANT CHANGE UP
-  TRIMETHOPRIM/SULFAMETHOXAZOLE: SIGNIFICANT CHANGE UP
ANION GAP SERPL CALC-SCNC: 7 MMOL/L — SIGNIFICANT CHANGE UP (ref 5–17)
BUN SERPL-MCNC: 23 MG/DL — SIGNIFICANT CHANGE UP (ref 7–23)
CALCIUM SERPL-MCNC: 9.1 MG/DL — SIGNIFICANT CHANGE UP (ref 8.4–10.5)
CHLORIDE SERPL-SCNC: 107 MMOL/L — SIGNIFICANT CHANGE UP (ref 96–108)
CO2 SERPL-SCNC: 26 MMOL/L — SIGNIFICANT CHANGE UP (ref 22–31)
CREAT SERPL-MCNC: 1.04 MG/DL — SIGNIFICANT CHANGE UP (ref 0.5–1.3)
CULTURE RESULTS: ABNORMAL
EGFR: 49 ML/MIN/1.73M2 — LOW
EGFR: 49 ML/MIN/1.73M2 — LOW
GLUCOSE SERPL-MCNC: 103 MG/DL — HIGH (ref 70–99)
HCT VFR BLD CALC: 24.3 % — LOW (ref 34.5–45)
HGB BLD-MCNC: 8.2 G/DL — LOW (ref 11.5–15.5)
MCHC RBC-ENTMCNC: 33.7 G/DL — SIGNIFICANT CHANGE UP (ref 32–36)
MCHC RBC-ENTMCNC: 35 PG — HIGH (ref 27–34)
MCV RBC AUTO: 103.8 FL — HIGH (ref 80–100)
METHOD TYPE: SIGNIFICANT CHANGE UP
NRBC BLD AUTO-RTO: 0 /100 WBCS — SIGNIFICANT CHANGE UP (ref 0–0)
ORGANISM # SPEC MICROSCOPIC CNT: ABNORMAL
ORGANISM # SPEC MICROSCOPIC CNT: SIGNIFICANT CHANGE UP
PLATELET # BLD AUTO: 161 K/UL — SIGNIFICANT CHANGE UP (ref 150–400)
POTASSIUM SERPL-MCNC: 3.8 MMOL/L — SIGNIFICANT CHANGE UP (ref 3.5–5.3)
POTASSIUM SERPL-SCNC: 3.8 MMOL/L — SIGNIFICANT CHANGE UP (ref 3.5–5.3)
RBC # BLD: 2.34 M/UL — LOW (ref 3.8–5.2)
RBC # FLD: 13.9 % — SIGNIFICANT CHANGE UP (ref 10.3–14.5)
SODIUM SERPL-SCNC: 140 MMOL/L — SIGNIFICANT CHANGE UP (ref 135–145)
SPECIMEN SOURCE: SIGNIFICANT CHANGE UP
WBC # BLD: 7.17 K/UL — SIGNIFICANT CHANGE UP (ref 3.8–10.5)
WBC # FLD AUTO: 7.17 K/UL — SIGNIFICANT CHANGE UP (ref 3.8–10.5)

## 2025-05-14 PROCEDURE — 99233 SBSQ HOSP IP/OBS HIGH 50: CPT

## 2025-05-14 RX ORDER — ACETAMINOPHEN 500 MG/5ML
975 LIQUID (ML) ORAL EVERY 8 HOURS
Refills: 0 | Status: COMPLETED | OUTPATIENT
Start: 2025-05-14 | End: 2025-05-16

## 2025-05-14 RX ORDER — APIXABAN 2.5 MG/1
2.5 TABLET, FILM COATED ORAL EVERY 12 HOURS
Refills: 0 | Status: DISCONTINUED | OUTPATIENT
Start: 2025-05-14 | End: 2025-05-16

## 2025-05-14 RX ORDER — LIDOCAINE HYDROCHLORIDE 20 MG/ML
1 JELLY TOPICAL DAILY
Refills: 0 | Status: DISCONTINUED | OUTPATIENT
Start: 2025-05-14 | End: 2025-05-16

## 2025-05-14 RX ADMIN — APIXABAN 2.5 MILLIGRAM(S): 2.5 TABLET, FILM COATED ORAL at 17:55

## 2025-05-14 RX ADMIN — Medication 650 MILLIGRAM(S): at 12:46

## 2025-05-14 RX ADMIN — DORZOLAMIDE HYDROCHLORIDE AND TIMOLOL MALEATE 1 DROP(S): 20; 5 SOLUTION/ DROPS OPHTHALMIC at 06:58

## 2025-05-14 RX ADMIN — Medication 1 TABLET(S): at 12:46

## 2025-05-14 RX ADMIN — DORZOLAMIDE HYDROCHLORIDE AND TIMOLOL MALEATE 1 DROP(S): 20; 5 SOLUTION/ DROPS OPHTHALMIC at 17:55

## 2025-05-14 RX ADMIN — ATORVASTATIN CALCIUM 40 MILLIGRAM(S): 80 TABLET, FILM COATED ORAL at 21:30

## 2025-05-14 RX ADMIN — Medication 1 DOSE(S): at 21:49

## 2025-05-14 RX ADMIN — Medication 650 MILLIGRAM(S): at 07:25

## 2025-05-14 RX ADMIN — Medication 650 MILLIGRAM(S): at 13:30

## 2025-05-14 RX ADMIN — Medication 975 MILLIGRAM(S): at 21:59

## 2025-05-14 RX ADMIN — Medication 975 MILLIGRAM(S): at 21:29

## 2025-05-14 RX ADMIN — CEFTRIAXONE 100 MILLIGRAM(S): 500 INJECTION, POWDER, FOR SOLUTION INTRAMUSCULAR; INTRAVENOUS at 06:59

## 2025-05-14 RX ADMIN — LIDOCAINE HYDROCHLORIDE 1 PATCH: 20 JELLY TOPICAL at 21:33

## 2025-05-14 RX ADMIN — Medication 650 MILLIGRAM(S): at 06:55

## 2025-05-14 RX ADMIN — TRAMADOL HYDROCHLORIDE 25 MILLIGRAM(S): 50 TABLET, FILM COATED ORAL at 19:38

## 2025-05-14 RX ADMIN — Medication 1 DOSE(S): at 09:12

## 2025-05-14 RX ADMIN — TRAMADOL HYDROCHLORIDE 25 MILLIGRAM(S): 50 TABLET, FILM COATED ORAL at 20:08

## 2025-05-14 RX ADMIN — POLYETHYLENE GLYCOL 3350 17 GRAM(S): 17 POWDER, FOR SOLUTION ORAL at 21:29

## 2025-05-14 RX ADMIN — Medication 3 MILLIGRAM(S): at 21:29

## 2025-05-14 RX ADMIN — Medication 40 MILLIGRAM(S): at 06:58

## 2025-05-14 NOTE — DIETITIAN INITIAL EVALUATION ADULT - PERTINENT LABORATORY DATA
05-14    140  |  107  |  23  ----------------------------<  103[H]  3.8   |  26  |  1.04    Ca    9.1      14 May 2025 07:11  Mg     1.7     05-13

## 2025-05-14 NOTE — DIETITIAN INITIAL EVALUATION ADULT - BUCCAL DEPLETION IS
6/3/2019       RE: Franklin Muir  [address]  [place] [address]     Dear Colleague,    Thank you for referring your patient, Franklin Muir, to the [facility] ENDOCRINOLOGY at [facility]. Please see a copy of my visit note below.    HPI:   Franklin is a 45 yo man here for follow up of type 1 diabetes, diagnosed 10 years ago.  He also sees Dr. Wilson.  He is currently on Lantus to 11 units daily at 7 AM and Novolog 1 unit/20 grams of CHO with meals and snacks, and for pre-meal correction @ 1/2 U per 30 mg/dL above 150 during the day and over 200 at HS.  He finds that he climbs quite high after a bolus, then comes back down  3 hours later. He wonders if he could take more insulin after eating if he goes into the 300s.       Franklin is wearing a dexcom G6 sensor with overall average of 165 mg/dL this past month.  He really likes having a sensor and feels it helps him manage his diabetes better.  He has never worn a pump.  He does have strong symptoms alerting him to hypoglycemia.  His brother also had type 1 diabetes and  of DKA.    He is very physically active, especially with his young son.  He does cardio and weight lifting, basketball, mostly in the mid-day.  He takes an extra snack before exercise.     rFanklin also has a history of Hashimoto's ab positivity with normal function, has never been on levothyroxine replacement therapy.      He reports that things have been going well, but he has been feeling a bit achy, chilled and tired for the past couple days.  His wife has had a cold, but no one else has been sick around him.  No cough, cold, chest pain or shortness of breath.     ROS   GENERAL: no weight loss, weight gain, fevers.  +chills, body aches for 2 days.  HEENT: no dysphagia, diplopia, neck pain or tenderness, dry/scratchy eyes, URI, cough, sinus drainage, tinnitus, sinus pressure  CV: no chest pain, pressure, palpitations, skipped beats, LOC  LUNGS: no  SOB, FOUNTAIN, cough, sputum production, wheezing   GI: no diarrhea, constipation, abdominal pain  EXTREMITIES: no rashes, ulcers, edema  NEUROLOGY: no changes in vision, tingling or numbness in hands or feet.   MSK: no muscle aches or pains, weakness  PSYCH: mood stable    Past Medical History:   Diagnosis Date     Chronic lymphocytic thyroiditis 7/20/2011     Chronic lymphocytic thyroiditis 7/20/2011     Chronic lymphocytic thyroiditis      Depressive disorder 06/01/1997    Treated for a couple of years; not currently experiencing     Diabetes 6/23/2009       Past Surgical History:   Procedure Laterality Date     HC TOOTH EXTRACTION W/FORCEP         Family History   Problem Relation Age of Onset     Hyperlipidemia Mother      Diabetes Brother      Hypertension Paternal Grandfather      Cerebrovascular Disease Paternal Grandfather      Hypertension Maternal Grandfather      Hyperlipidemia Maternal Grandfather      Cerebrovascular Disease Maternal Grandfather      Breast Cancer Maternal Aunt      Diabetes Brother      Anxiety Disorder Brother      Coronary Artery Disease Other         Maternal great grandfather     Hypertension Maternal Grandmother      Hyperlipidemia Maternal Grandmother      Hyperlipidemia Brother      Cerebrovascular Disease Paternal Grandmother      Asthma No family hx of      C.A.D. No family hx of      Cancer - colorectal No family hx of      Prostate Cancer No family hx of        Social History     Social History     Marital status:      Spouse name: N/A     Number of children: N/A     Years of education: N/A     Social History Main Topics     Smoking status: Never Smoker     Smokeless tobacco: Never Used     Alcohol use Yes      Comment: ~ 1 drink/day     Drug use: No     Sexual activity: Yes     Partners: Female     Birth control/ protection: Condom, Natural Family Planning      Comment: Would like to talk about vasectomy     Other Topics Concern     Parent/Sibling W/ Cabg, Mi Or  "Angioplasty Before 65f 55m? No     Social History Narrative   Social Hx:   .  Has a young son.  Works a  at the [employer]. Now . Physically active.    Current Outpatient Medications   Medication     ACE/ARB NOT PRESCRIBED, INTENTIONAL,     acetaminophen-codeine (TYLENOL #3) 300-30 MG tablet     atorvastatin (LIPITOR) 10 MG tablet     blood glucose (ESTEVAN BREEZE 2) test strip     blood glucose (NO BRAND SPECIFIED) test strip     blood glucose monitoring (NO BRAND SPECIFIED) meter device kit     Blood Glucose Monitoring Suppl (OmadaEZE 2 SYSTEM) W/DEVICE KIT     glucagon (GLUCAGON EMERGENCY) 1 MG kit     Injection Device for insulin (NOVOPEN JR, GREEN,) DUSTIN     insulin glargine (LANTUS SOLOSTAR) 100 UNIT/ML pen     insulin pen needle (B-D U/F) 31G X 5 MM     loratadine (CLARITIN) 10 MG tablet     NOVOLOG PENFILL 100 UNIT/ML soln     Sodium Fluoride (CLINPRO 5000) 1.1 % PSTE     VITAMIN D, CHOLECALCIFEROL, PO     No current facility-administered medications for this visit.           Allergies   Allergen Reactions     Ceftin Rash     Ampicillin Rash       Physical Exam  /79   Pulse 85   Temp 98.5  F (36.9  C) (Oral)   Ht 1.778 m (5' 10\")   Wt 79.1 kg (174 lb 4.8 oz)   BMI 25.01 kg/m     GENERAL:  Alert and oriented X3, NAD, well dressed, answering questions appropriately, appears stated age.  HEENT: OP clear, no lymphadenopathy, no thyromegaly, non-tender, no exophthalmus, no proptosis, EOMI, no lid lag, no retraction  CV: RRR, no murmurs  LUNGS: CTAB, no wheezes, rales, or ronchi  EXTREMITIES: no edema, +pulses, no rashes, no lesions  NEUROLOGY: CN grossly intact, + monofilament, + DTR upper and lower extremity  MSK: grossly intact    RESULTS  Lab Results   Component Value Date    A1C 8.4 (H) 03/13/2017    A1C 7.6 (H) 02/18/2016    A1C 7.8 (H) 07/07/2015    A1C 6.9 (H) 12/03/2013    A1C 6.9 (A) 09/25/2013    HEMOGLOBINA1 7.0 (A) 03/12/2019    " HEMOGLOBINA1 7.9 (A) 11/13/2018    HEMOGLOBINA1 7.7 (A) 08/13/2018    HEMOGLOBINA1 7.6 (A) 04/24/2018    HEMOGLOBINA1 7.3 (A) 02/05/2018       TSH   Date Value Ref Range Status   05/24/2019 1.89 0.40 - 4.00 mU/L Final   04/18/2018 2.20 0.40 - 4.00 mU/L Final   03/13/2017 2.81 0.40 - 4.00 mU/L Final   02/18/2016 3.26 0.40 - 4.00 mU/L Final   07/07/2015 1.70 0.40 - 4.00 mU/L Final     T4 Total   Date Value Ref Range Status   09/28/2010 9.0 5.0 - 11.0 ug/dL Final     T4 Free   Date Value Ref Range Status   02/18/2016 1.06 0.76 - 1.46 ng/dL Final   07/07/2015 1.04 0.76 - 1.46 ng/dL Final   01/05/2015 1.02 0.76 - 1.46 ng/dL Final     Comment:     Effective 7/30/2014, the reference range for this assay has changed to reflect   new instrumentation/methodology.     12/03/2013 1.08 0.70 - 1.85 ng/dL Final   01/26/2011 1.10 0.70 - 1.85 ng/dL Final       ALT   Date Value Ref Range Status   05/24/2019 25 0 - 70 U/L Final   03/13/2017 24 0 - 70 U/L Final   ]    Recent Labs   Lab Test 05/24/19  0938 05/04/18  1016  01/05/15  0933 12/03/13  0907   CHOL 122 121   < > 134 163   HDL 50 50   < > 47 43   LDL 63 66   < > 76 108   TRIG 43 23   < > 57 57   CHOLHDLRATIO  --   --   --  2.9 3.8    < > = values in this interval not displayed.       Lab Results   Component Value Date     03/13/2017      Lab Results   Component Value Date    POTASSIUM 4.3 03/13/2017     Lab Results   Component Value Date    CHLORIDE 102 03/13/2017     Lab Results   Component Value Date    SHANIKA 9.1 03/13/2017     Lab Results   Component Value Date    CO2 31 03/13/2017     Lab Results   Component Value Date    BUN 15 03/13/2017     Lab Results   Component Value Date    CR 0.88 05/24/2019       GFR Estimate   Date Value Ref Range Status   05/24/2019 >90 >60 mL/min/[1.73_m2] Final     Comment:     Non  GFR Calc  Starting 12/18/2018, serum creatinine based estimated GFR (eGFR) will be   calculated using the Chronic Kidney Disease Epidemiology  "Collaboration   (CKD-EPI) equation.     04/18/2018 >90 >60 mL/min/1.7m2 Final     Comment:     Non  GFR Calc   03/13/2017 >90  Non  GFR Calc   >60 mL/min/1.7m2 Final     GFR Estimate If Black   Date Value Ref Range Status   05/24/2019 >90 >60 mL/min/[1.73_m2] Final     Comment:      GFR Calc  Starting 12/18/2018, serum creatinine based estimated GFR (eGFR) will be   calculated using the Chronic Kidney Disease Epidemiology Collaboration   (CKD-EPI) equation.     04/18/2018 >90 >60 mL/min/1.7m2 Final     Comment:      GFR Calc   03/13/2017 >90   GFR Calc   >60 mL/min/1.7m2 Final       Lab Results   Component Value Date    MICROL <5 05/24/2019     No results found for: MICROALBUMIN  Lab Results   Component Value Date    CPEPT 2.3 11/19/2009    GADAB >250.0 (H) 11/19/2009    ISCAB  09/08/2009     1:16  Reference range: <1:4  (Note)  TEST INFORMATION: Islet Cell Ab, IgG  Islet cell antibodies have been associated with  \"autoimmune\" endocrine disorders and insulin-dependent  diabetes. This disorder is characterized by the presence  of antibodies in patients that may be detected years  before the onset of the clinical symptoms. To calculate  Juvenile Diabetes Foundation (JDF) units: multiply the  titer x 5 (1:8  8 x 5 = 40 JDF Units).  Performed by [facility],  [address] [place] [id number] [phone]  [email or web address], Evelia Miles MD, Lab. Director       No results found for: B12]    Most recent eye exam date: : Not Found     Eye exam in January, 2019- no retinopathy      Assessment/Plan:     1.  Type 1 diabetes- Overall, doing well, but he feels he is climbing too high post-meal.  We will try tightening IC ratio to 1/20g, but I suggested he reduce Lantus to 10 units to reduce the risk of fasting hypoglycemia.  He may need to reduce further to 9 units.  A1c is excellent at 6.6%, the lowest it has been. He has benefited greatly " from sensor.     2.  Risk factors-     Retinopathy:  No.  Had eye exam within last year.   Nephropathy:  BP well controlled. No microalbuminuria.  Creatinine stable.   Neuropathy: No.    Feet: OK, no ulcers.   Lipids:  LDL at target.  Patient taking statin    3.  Fatigue/ Body aches- exam is normal and so are vitals. TSH is normal. It is possible he has the start of a virus.  Will monitor symptoms and f/u with PCP if they persist or worsen.       4. F/U in 3 months with Dr. Wilson.     I spent 30 minutes with this patient face to face and explained the conditions and plans (more than 50% of time was counseling/coordination of care, diabetes management, follow up plan for worsening hyper and hypoglycemia) . The patient understood and is satisfied with today's visit.      Liz Conroy PA-C, MPAS   [facility]  Department of Medicine  Division of Endocrinology and Diabetes       moderate

## 2025-05-14 NOTE — PROGRESS NOTE ADULT - SUBJECTIVE AND OBJECTIVE BOX
Patient is a 89y old  Female who presents with a chief complaint of s/p fall (14 May 2025 10:02)    Patient seen and examined at bedside.  no acute events overnight    ALLERGIES:  No Known Allergies        Vital Signs Last 24 Hrs  T(F): 98.2 (14 May 2025 06:27), Max: 98.2 (14 May 2025 06:27)  HR: 98 (14 May 2025 06:27) (76 - 98)  BP: 109/70 (14 May 2025 06:27) (109/70 - 150/78)  RR: 16 (14 May 2025 06:27) (16 - 17)  SpO2: 92% (14 May 2025 06:27) (92% - 96%)  I&O's Summary    13 May 2025 07:01  -  14 May 2025 07:00  --------------------------------------------------------  IN: 550 mL / OUT: 600 mL / NET: -50 mL    14 May 2025 07:01  -  14 May 2025 10:08  --------------------------------------------------------  IN: 0 mL / OUT: 2 mL / NET: -2 mL      MEDICATIONS:  acetaminophen     Tablet .. 650 milliGRAM(s) Oral every 6 hours  aluminum hydroxide/magnesium hydroxide/simethicone Suspension 30 milliLiter(s) Oral every 4 hours PRN  atorvastatin 40 milliGRAM(s) Oral at bedtime  cefTRIAXone   IVPB 1000 milliGRAM(s) IV Intermittent every 24 hours  diltiazem    milliGRAM(s) Oral daily  dorzolamide 2%/timolol 0.5% Ophthalmic Solution 1 Drop(s) Both EYES two times a day  fluticasone propionate/ salmeterol 100-50 MICROgram(s) Diskus 1 Dose(s) Inhalation two times a day  lactobacillus acidophilus 1 Tablet(s) Oral with lunch  melatonin 3 milliGRAM(s) Oral at bedtime PRN  multivitamin 1 Tablet(s) Oral daily  ondansetron Injectable 4 milliGRAM(s) IV Push every 8 hours PRN  pantoprazole    Tablet 40 milliGRAM(s) Oral before breakfast  polyethylene glycol 3350 17 Gram(s) Oral at bedtime  traMADol 25 milliGRAM(s) Oral every 6 hours PRN  traMADol 50 milliGRAM(s) Oral every 8 hours PRN      PHYSICAL EXAM:  General: NAD, Alert elderly female  ENT: MMM, no thrush  Neck: Supple, No JVD  Lungs: Clear to auscultation bilaterally, non labored, good air entry  Cardio: RRR, S1/S2, No murmurs  Abdomen: Soft, Nontender, Nondistended; Bowel sounds present  Extremities: No cyanosis, No edema, RT knee swelling in immobilizer    LABS:                        8.2    7.17  )-----------( 161      ( 14 May 2025 07:11 )             24.3     05-14    140  |  107  |  23  ----------------------------<  103  3.8   |  26  |  1.04    Ca    9.1      14 May 2025 07:11  Mg     1.7     05-13    TPro  6.2  /  Alb  3.1  /  TBili  0.8  /  DBili  x   /  AST  27  /  ALT  25  /  AlkPhos  70  05-11      PT/INR - ( 12 May 2025 07:48 )   PT: 17.0 sec;   INR: 1.45 ratio         PTT - ( 11 May 2025 15:10 )  PTT:35.4 sec    CARDIAC MARKERS ( 11 May 2025 15:10 )  x     / 12.2 ng/L / x     / x     / x                            Urinalysis Basic - ( 14 May 2025 07:11 )    Color: x / Appearance: x / SG: x / pH: x  Gluc: 103 mg/dL / Ketone: x  / Bili: x / Urobili: x   Blood: x / Protein: x / Nitrite: x   Leuk Esterase: x / RBC: x / WBC x   Sq Epi: x / Non Sq Epi: x / Bacteria: x        Culture - Urine (collected 11 May 2025 19:05)  Source: Clean Catch None  Final Report (14 May 2025 07:27):    >100,000 CFU/ml Escherichia coli  Organism: Escherichia coli (14 May 2025 07:27)  Organism: Escherichia coli (14 May 2025 07:27)      -  Levofloxacin: S <=0.5      -  Tobramycin: S <=2      -  Nitrofurantoin: S <=32 Should not be used to treat pyelonephritis      -  Aztreonam: S <=4      -  Gentamicin: S <=2      -  Cefazolin: S <=2 For uncomplicated UTI with K. pneumoniae, E. coli, or P. mirablis: SKYLAR <=16 is sensitive and SKYLAR >=32 is resistant. This also predicts results for oral agents cefaclor, cefdinir, cefpodoxime, cefprozil, cefuroxime axetil, cephalexin and locarbef for uncomplicated UTI. Note that some isolates may be susceptible to these agents while testing resistant to cefazolin.      -  Cefepime: S <=2      -  Piperacillin/Tazobactam: S <=8      -  Ciprofloxacin: S <=0.25      -  Imipenem: S <=1      -  Ceftriaxone: S <=1      -  Ampicillin: S <=8 These ampicillin results predict results for amoxicillin      Method Type: SKYLAR      -  Meropenem: S <=1      -  Ampicillin/Sulbactam: S <=4/2      -  Cefoxitin: S <=8      -  Cefuroxime: S <=4      -  Amoxicillin/Clavulanic Acid: S <=8/4      -  Trimethoprim/Sulfamethoxazole: S <=0.5/9.5      -  Tigecycline: S <=2 Interpretations based on FDA breakpoints      -  Ertapenem: S <=0.5          RADIOLOGY & ADDITIONAL TESTS:    Care Discussed with Consultants/Other Providers:

## 2025-05-14 NOTE — PROGRESS NOTE ADULT - ASSESSMENT
89 year old female with PMHx HTN, HLD, Afib on eliquis, MVP, and arthritis presents to ED s/p fall at home. Patient admitted with ambulatory dysfunction, right knee pain, R/o UTI.     # RT knee Periprosthetic Fracture  -ortho following, imaging showed right knee arthroplasty with probable acute impacted periprosthetic fracture surrounding femoral component of RT knee arthroplasty  -no plans for surgery  -pain management, NWB RT LE with immobilizer, Fall Precautions  -PT recommends home with PT when ready    # UTI  - continue CTX, three day course  - urine culture and sensitivities reveal pan sensitive E Coli    # EDGAR  - likely due to dehydration  - resolved  - continue to monitor renal function closely    #Afib; chronic  #HTN, HLD  - on home Cartia XT 180mg ER, continue Diltiazem   - Eliquis  - on home amlodipine-benazepril 5-10mg PRN. Currently on hold. Will monitor BP and resume as needed  - continue statin     # Anemia  - hb on dec 2024 12. now 8.3  - no apparent ssx of bleeding  - H/H stble.   - hold Eliquis for now pending Ortho consult  -  fobt ordered    #Glaucoma  - continue Dorzolamide eye drops     #GERD  - continue protonix in house     DVT Prophylaxis:  - Eliquis    #GOC -  DNR/DNI    Family wishes for patient to be home   89 year old female with PMHx HTN, HLD, Afib on eliquis, MVP, and arthritis presents to ED s/p fall at home. Patient admitted with ambulatory dysfunction, right knee pain, R/o UTI.     # RT knee Periprosthetic Fracture  -ortho following, imaging showed right knee arthroplasty with probable acute impacted periprosthetic fracture surrounding femoral component of RT knee arthroplasty  -no plans for surgery  -pain management, NWB RT LE with immobilizer, Fall Precautions  -PT recommends home with PT when ready    # UTI  - continue CTX, three day course. completed today  - urine culture and sensitivities reveal pan sensitive E Coli    # EDGAR  - likely due to dehydration  - resolved  - continue to monitor renal function closely    #Afib; chronic  #HTN, HLD  - on home Cartia XT 180mg ER, continue Diltiazem   - Eliquis : held on admission. resumed today  - on home amlodipine-benazepril 5-10mg PRN. Currently on hold. Will monitor BP and resume as needed  - continue statin     # Anemia  - hb on dec 2024 12. now 8.3  - no apparent ssx of bleeding  - H/H stble.   - resumed eliquis  -  fobt ordered    #Glaucoma  - continue Dorzolamide eye drops     #GERD  - continue protonix in house     # severe malnutrition  - nutrition is following    DVT Prophylaxis:  - Eliquis    #GOC -  DNR/DNI    dispo: Family wishes for patient to be home with preet lift, hospital bed, WC. CM is aware. resumed eliquis today. will repeat h/h in am. can dc home tomorrow if h/h stable and DME is delivered. likely 24-48 hrs

## 2025-05-14 NOTE — DIETITIAN INITIAL EVALUATION ADULT - ADD RECOMMEND
1. Ensure Plus High Protein 8oz PO BID (Provides 700kcal & 40grams of Protein)   2. Monitor daily PO intakes, GI tolerance, labs, skin integrity, & BM regularity

## 2025-05-14 NOTE — DIETITIAN NUTRITION RISK NOTIFICATION - TREATMENT: THE FOLLOWING DIET HAS BEEN RECOMMENDED
Diet, Regular:   Supplement Feeding Modality:  Oral  Ensure Plus High Protein Cans or Servings Per Day:  1       Frequency:  Two Times a day (05-14-25 @ 10:09) [Pending Verification By Attending]  Diet, Regular (05-11-25 @ 16:58) [Active]

## 2025-05-14 NOTE — DIETITIAN INITIAL EVALUATION ADULT - OTHER INFO
Patient noted with suboptimal PO intake, consuming 0-50% of meals at this time per nursing flowsheets. NFPE consistent with severe protein calorie malnutrition as evidenced by muscle depletion and subcutaneous fat loss. Recommend Ensure Plus High Protein 8oz PO BID (Provides 700kcal & 40grams of Protein) to enhance PO intakes and optimize nutritional status.  Patient noted with suboptimal PO intake, consuming 0-50% of meals at this time per nursing flowsheets. NFPE consistent with severe protein calorie malnutrition as evidenced by muscle depletion and subcutaneous fat loss. Recommend Ensure Plus High Protein 8oz PO BID (Provides 700kcal & 40grams of Protein) to enhance PO intakes and optimize nutritional status. MOLST reviewed. DNR/DNI + no feeding tube indicated.

## 2025-05-14 NOTE — PROGRESS NOTE ADULT - SUBJECTIVE AND OBJECTIVE BOX
INTERVAL HPI/OVERNIGHT EVENTS:  No noted overnight events   Pt. seen and examined at bedside resting comfortably.  Right knee immobilizer in place    Vital Signs Last 24 Hrs  T(C): 36.8 (14 May 2025 06:27), Max: 36.8 (14 May 2025 06:27)  T(F): 98.2 (14 May 2025 06:27), Max: 98.2 (14 May 2025 06:27)  HR: 98 (14 May 2025 06:27) (76 - 98)  BP: 109/70 (14 May 2025 06:27) (109/70 - 150/78)  BP(mean): --  RR: 16 (14 May 2025 06:27) (16 - 17)  SpO2: 92% (14 May 2025 06:27) (92% - 96%)    Parameters below as of 14 May 2025 06:27  Patient On (Oxygen Delivery Method): room air        PHYSICAL EXAM:    GENERAL: NAD  HEAD:  Atraumatic, Normocephalic  CHEST/LUNG: Equal and bilateral chest rise and fall  HEART: Not tachycardic  EXTREMITIES: No cyanosis, No edema, RT knee swelling in immobilizer, swelling less than Monday,     I&O's Detail    13 May 2025 07:01  -  14 May 2025 07:00  --------------------------------------------------------  IN:    IV PiggyBack: 50 mL    Oral Fluid: 500 mL  Total IN: 550 mL    OUT:    Voided (mL): 600 mL  Total OUT: 600 mL    Total NET: -50 mL      14 May 2025 07:01  -  14 May 2025 10:45  --------------------------------------------------------  IN:  Total IN: 0 mL    OUT:    Incontinent per Diaper, Weight (mL): 2 mL  Total OUT: 2 mL    Total NET: -2 mL          LABS:                        8.2    7.17  )-----------( 161      ( 14 May 2025 07:11 )             24.3     05-14    140  |  107  |  23  ----------------------------<  103[H]  3.8   |  26  |  1.04    Ca    9.1      14 May 2025 07:11  Mg     1.7     05-13        Urinalysis Basic - ( 14 May 2025 07:11 )    Color: x / Appearance: x / SG: x / pH: x  Gluc: 103 mg/dL / Ketone: x  / Bili: x / Urobili: x   Blood: x / Protein: x / Nitrite: x   Leuk Esterase: x / RBC: x / WBC x   Sq Epi: x / Non Sq Epi: x / Bacteria: x      Culture Results:   >100,000 CFU/ml Escherichia coli (05-11 @ 19:05)    type    RADIOLOGY & ADDITIONAL STUDIES:    Impression: 89F admitted s/p fall at home on Friday for right knee pain    Plan:  Admitted to the medical team  No surgical intervention at this time  NWB  Continue knee immobilizer    Please have patient follow up outpatient with Dr. Hamilton and reconsult with any future questions or concerns    Patient discussed with Dr. Hamilton

## 2025-05-14 NOTE — DIETITIAN INITIAL EVALUATION ADULT - PERTINENT MEDS FT
MEDICATIONS  (STANDING):  acetaminophen     Tablet .. 650 milliGRAM(s) Oral every 6 hours  atorvastatin 40 milliGRAM(s) Oral at bedtime  cefTRIAXone   IVPB 1000 milliGRAM(s) IV Intermittent every 24 hours  diltiazem    milliGRAM(s) Oral daily  dorzolamide 2%/timolol 0.5% Ophthalmic Solution 1 Drop(s) Both EYES two times a day  fluticasone propionate/ salmeterol 100-50 MICROgram(s) Diskus 1 Dose(s) Inhalation two times a day  lactobacillus acidophilus 1 Tablet(s) Oral with lunch  multivitamin 1 Tablet(s) Oral daily  pantoprazole    Tablet 40 milliGRAM(s) Oral before breakfast  polyethylene glycol 3350 17 Gram(s) Oral at bedtime    MEDICATIONS  (PRN):  aluminum hydroxide/magnesium hydroxide/simethicone Suspension 30 milliLiter(s) Oral every 4 hours PRN Dyspepsia  melatonin 3 milliGRAM(s) Oral at bedtime PRN Insomnia  ondansetron Injectable 4 milliGRAM(s) IV Push every 8 hours PRN Nausea and/or Vomiting  traMADol 25 milliGRAM(s) Oral every 6 hours PRN Moderate Pain (4 - 6)  traMADol 50 milliGRAM(s) Oral every 8 hours PRN Severe Pain (7 - 10)

## 2025-05-14 NOTE — PROGRESS NOTE ADULT - NS ATTEND AMEND GEN_ALL_CORE FT
Reviewed case with Dr. Ferguson and Dr. Valles regarding non op for right distal femur periprosthetic fracture.  Agree with Plan  Daily skin checks  knee immobilizer for 2 weeks followed by conversion to janet brace to start knee ROM  NWB for 6 weeks    DLS

## 2025-05-15 ENCOUNTER — TRANSCRIPTION ENCOUNTER (OUTPATIENT)
Age: 89
End: 2025-05-15

## 2025-05-15 LAB
ANION GAP SERPL CALC-SCNC: 6 MMOL/L — SIGNIFICANT CHANGE UP (ref 5–17)
BUN SERPL-MCNC: 25 MG/DL — HIGH (ref 7–23)
CALCIUM SERPL-MCNC: 8.9 MG/DL — SIGNIFICANT CHANGE UP (ref 8.4–10.5)
CHLORIDE SERPL-SCNC: 107 MMOL/L — SIGNIFICANT CHANGE UP (ref 96–108)
CO2 SERPL-SCNC: 25 MMOL/L — SIGNIFICANT CHANGE UP (ref 22–31)
CREAT SERPL-MCNC: 1 MG/DL — SIGNIFICANT CHANGE UP (ref 0.5–1.3)
EGFR: 51 ML/MIN/1.73M2 — LOW
EGFR: 51 ML/MIN/1.73M2 — LOW
FOLATE SERPL-MCNC: >20 NG/ML — SIGNIFICANT CHANGE UP
GLUCOSE SERPL-MCNC: 107 MG/DL — HIGH (ref 70–99)
HCT VFR BLD CALC: 24.7 % — LOW (ref 34.5–45)
HGB BLD-MCNC: 8 G/DL — LOW (ref 11.5–15.5)
MCHC RBC-ENTMCNC: 32.4 G/DL — SIGNIFICANT CHANGE UP (ref 32–36)
MCHC RBC-ENTMCNC: 34.3 PG — HIGH (ref 27–34)
MCV RBC AUTO: 106 FL — HIGH (ref 80–100)
NRBC BLD AUTO-RTO: 0 /100 WBCS — SIGNIFICANT CHANGE UP (ref 0–0)
PLATELET # BLD AUTO: 176 K/UL — SIGNIFICANT CHANGE UP (ref 150–400)
POTASSIUM SERPL-MCNC: 3.9 MMOL/L — SIGNIFICANT CHANGE UP (ref 3.5–5.3)
POTASSIUM SERPL-SCNC: 3.9 MMOL/L — SIGNIFICANT CHANGE UP (ref 3.5–5.3)
RBC # BLD: 2.33 M/UL — LOW (ref 3.8–5.2)
RBC # FLD: 14 % — SIGNIFICANT CHANGE UP (ref 10.3–14.5)
SODIUM SERPL-SCNC: 138 MMOL/L — SIGNIFICANT CHANGE UP (ref 135–145)
VIT B12 SERPL-MCNC: 595 PG/ML — SIGNIFICANT CHANGE UP (ref 232–1245)
WBC # BLD: 7.48 K/UL — SIGNIFICANT CHANGE UP (ref 3.8–10.5)
WBC # FLD AUTO: 7.48 K/UL — SIGNIFICANT CHANGE UP (ref 3.8–10.5)

## 2025-05-15 PROCEDURE — 99233 SBSQ HOSP IP/OBS HIGH 50: CPT

## 2025-05-15 PROCEDURE — 99497 ADVNCD CARE PLAN 30 MIN: CPT

## 2025-05-15 RX ADMIN — APIXABAN 2.5 MILLIGRAM(S): 2.5 TABLET, FILM COATED ORAL at 06:12

## 2025-05-15 RX ADMIN — Medication 975 MILLIGRAM(S): at 14:00

## 2025-05-15 RX ADMIN — TRAMADOL HYDROCHLORIDE 25 MILLIGRAM(S): 50 TABLET, FILM COATED ORAL at 21:12

## 2025-05-15 RX ADMIN — Medication 1 DOSE(S): at 08:42

## 2025-05-15 RX ADMIN — POLYETHYLENE GLYCOL 3350 17 GRAM(S): 17 POWDER, FOR SOLUTION ORAL at 21:13

## 2025-05-15 RX ADMIN — Medication 1 DOSE(S): at 22:17

## 2025-05-15 RX ADMIN — APIXABAN 2.5 MILLIGRAM(S): 2.5 TABLET, FILM COATED ORAL at 17:48

## 2025-05-15 RX ADMIN — LIDOCAINE HYDROCHLORIDE 1 PATCH: 20 JELLY TOPICAL at 23:48

## 2025-05-15 RX ADMIN — Medication 975 MILLIGRAM(S): at 06:45

## 2025-05-15 RX ADMIN — DORZOLAMIDE HYDROCHLORIDE AND TIMOLOL MALEATE 1 DROP(S): 20; 5 SOLUTION/ DROPS OPHTHALMIC at 06:12

## 2025-05-15 RX ADMIN — Medication 1 TABLET(S): at 11:48

## 2025-05-15 RX ADMIN — Medication 975 MILLIGRAM(S): at 13:08

## 2025-05-15 RX ADMIN — Medication 975 MILLIGRAM(S): at 06:12

## 2025-05-15 RX ADMIN — LIDOCAINE HYDROCHLORIDE 1 PATCH: 20 JELLY TOPICAL at 20:26

## 2025-05-15 RX ADMIN — Medication 40 MILLIGRAM(S): at 06:12

## 2025-05-15 RX ADMIN — LIDOCAINE HYDROCHLORIDE 1 PATCH: 20 JELLY TOPICAL at 09:00

## 2025-05-15 RX ADMIN — Medication 1 TABLET(S): at 11:49

## 2025-05-15 RX ADMIN — LIDOCAINE HYDROCHLORIDE 1 PATCH: 20 JELLY TOPICAL at 11:48

## 2025-05-15 RX ADMIN — LIDOCAINE HYDROCHLORIDE 1 PATCH: 20 JELLY TOPICAL at 07:57

## 2025-05-15 RX ADMIN — DORZOLAMIDE HYDROCHLORIDE AND TIMOLOL MALEATE 1 DROP(S): 20; 5 SOLUTION/ DROPS OPHTHALMIC at 17:47

## 2025-05-15 RX ADMIN — TRAMADOL HYDROCHLORIDE 25 MILLIGRAM(S): 50 TABLET, FILM COATED ORAL at 21:43

## 2025-05-15 RX ADMIN — Medication 975 MILLIGRAM(S): at 21:13

## 2025-05-15 RX ADMIN — Medication 975 MILLIGRAM(S): at 21:43

## 2025-05-15 RX ADMIN — ATORVASTATIN CALCIUM 40 MILLIGRAM(S): 80 TABLET, FILM COATED ORAL at 21:12

## 2025-05-15 RX ADMIN — Medication 3 MILLIGRAM(S): at 21:12

## 2025-05-15 RX ADMIN — DILTIAZEM HYDROCHLORIDE 180 MILLIGRAM(S): 120 CAPSULE, EXTENDED RELEASE ORAL at 06:12

## 2025-05-15 NOTE — GOALS OF CARE CONVERSATION - ADVANCED CARE PLANNING - CONVERSATION DETAILS
Met and spoke w/ pt nad daughter Felipa today at bedside, pt awake, alert, very [place] and directed me to speak with her daughter . Daughter and her brothers are involved with pt care and aware of her med hx. Pt has been living alone w/ help of pvt HHA and largely independent in her home. She unfortunately had a fall recently in her home and fractured her knee. Will be conservative management and non weight bearing . daughter said she had initially thought about home hospice, but now feels her mother is doing much better , eating and interactive , so presently her plans are to take her home with home care and pvt hire HHA. I did discuss explain home hospice and that pt would need to be approved for these services. Discussed equipment supplied and covered by medicare . Daughter appreciative of information , however pt will be going home tomorrow with home care and DME set up in place. We discussed if pt declines at home that she may call hospice directly and have pt evaluated in the community . I gave her my contact info to call if she had any further questions about hospice services in the future.

## 2025-05-15 NOTE — PROGRESS NOTE ADULT - TIME BILLING
Time spent includes direct patient care  (interview and examination of patient), discussion with other providers, support staff and/or patient's family members, review of medical records, ordering diagnostic tests and analyzing results, and documentation.
Time spent includes direct patient care (interview and examination of patient), discussion with other providers, support staff and/or patient's family members, review of medical records, ordering diagnostic tests and analyzing results, and documentation

## 2025-05-15 NOTE — DISCHARGE NOTE PROVIDER - NSDCMRMEDTOKEN_GEN_ALL_CORE_FT
Advair Diskus 100 mcg-50 mcg inhalation powder: 1 inhaled once a day  atorvastatin 40 mg oral tablet: 1 tab(s) orally once a day (at bedtime)  dilTIAZem: 180 milligram(s) orally once a day  dorzolamide-timolol 2.23%-0.68% ophthalmic solution: 1 drop(s) to each affected eye 2 times a day  Eliquis 5 mg oral tablet: 1 tab(s) orally once a day  Fish Oil 1000 mg oral capsule: 1 cap(s) orally once a day  Knee immobilizer: Wear as instructed  lactobacillus acidophilus 200 mg oral capsule: 1 tab(s) orally once a day  multivitamin: 1 tab(s) orally once a day  Ocuvite oral tablet: 1 tab(s) orally once a day  OMEPRAZOLE DR 20 MG CAPSULE: 1  orally once a day (at bedtime)  Vitamin B1 100 mg oral tablet: 1 tab(s) orally once a day  Vitamin B12 500 mcg oral tablet: 1 tab(s) orally once a day  Vitamin C 500 mg oral tablet: 1 tab(s) orally once a day   Advair Diskus 100 mcg-50 mcg inhalation powder: 1 inhaled once a day  atorvastatin 40 mg oral tablet: 1 tab(s) orally once a day (at bedtime)  dilTIAZem 180 mg/24 hours oral capsule, extended release: 1 cap(s) orally once a day  dorzolamide-timolol 2.23%-0.68% ophthalmic solution: 1 drop(s) to each affected eye 2 times a day  Eliquis 2.5 mg oral tablet: 1 tab(s) orally 2 times a day  Fish Oil 1000 mg oral capsule: 1 cap(s) orally once a day  Knee immobilizer: Wear as instructed  lactobacillus acidophilus 200 mg oral capsule: 1 tab(s) orally once a day  multivitamin: 1 tab(s) orally once a day  Ocuvite oral tablet: 1 tab(s) orally once a day  OMEPRAZOLE DR 20 MG CAPSULE: 1  orally once a day (at bedtime)  Vitamin B1 100 mg oral tablet: 1 tab(s) orally once a day  Vitamin B12 500 mcg oral tablet: 1 tab(s) orally once a day  Vitamin C 500 mg oral tablet: 1 tab(s) orally once a day

## 2025-05-15 NOTE — PROGRESS NOTE ADULT - NS ATTEND AMEND GEN_ALL_CORE FT
89 year old female with PMHx HTN, HLD, Afib on eliquis, MVP, and arthritis who was brought in from home s/p fall w/ right knee pain. She was found to have acute impacted periprosthetic fracture surrounding the femoral component of the RIGHT knee arthroplasty on imaging.   She was seen by orthopedics, no surgical intervention recommended, NWB and knee immobilizer.    Patient seen and examined at bedside. She is AAO x 2, very hard of hearing, endorses right knee pain.    PHYSICAL EXAM:  GENERAL: NAD, frail   HEENT: NCAT  NECK: Supple, No JVD  CHEST/LUNG: Clear to percussion bilaterally; No rales, rhonchi, wheezing  HEART: Regular rate and rhythm; +murmur   ABDOMEN: Soft, Nontender, Nondistended; Bowel sounds present  MUSCULOSKELETAL/EXTREMITIES:  right knee bruising and edema, knee immobilizer in place.   PSYCH: Appropriate affect  NEURO: AAO x 2    Labs reviewed, H/H stable     Agree with plan as above  Patient admitted s/p fall with right knee periprosthetic fracture, non-operative per orthopedics.   Continue knee immobilizer for 2 weeks followed by conversion to janet brace to start knee ROM  NWB for 6 weeks. Outpatient follow up with Dr. Hamilton.   Continue pain control     She has completed 3 day course of ceftriaxone for E coli UTI, sensitivities reviewed     She also has macrocytic anemia, H/H has been stable, folate/vitamin B12 WNL. Avoid unnecessary blood draws. Outpatient follow up with PMD (Dr. Luong) - daughter is aware    Rest of plan as above     Plan of care discussed with patient's daughter over the phone, all questions were answered. Pending delivery of DME tomorrow for discharge home. Patient has 24 hr aide.

## 2025-05-15 NOTE — PROGRESS NOTE ADULT - ASSESSMENT
89 year old female with PMHx HTN, HLD, Afib on eliquis, MVP, and arthritis presents to ED s/p fall at home. Patient admitted with ambulatory dysfunction, right knee pain, R/o UTI.     # RT knee Periprosthetic Fracture  -ortho following, imaging showed right knee arthroplasty with probable acute impacted periprosthetic fracture surrounding femoral component of RT knee arthroplasty  -no plans for surgery  -pain management, NWB RT LE with immobilizer, Fall Precautions  -PT recommends home with PT when ready and DME delivered    # UTI  - continue CTX, three day course. completed today  - urine culture and sensitivities reveal pan sensitive E Coli    # EDGAR  - likely due to dehydration  - resolved  - continue to monitor renal function closely    #Afib; chronic  #HTN, HLD  - on home Cartia XT 180mg ER, continue Diltiazem   - Eliquis : held on admission. resumed today  - on home amlodipine-benazepril 5-10mg PRN. Currently on hold. Will monitor BP and resume as needed  - continue statin     # Anemia  - hb on dec 2024 12. now 8.3  - no apparent ssx of bleeding  - H/H stble.   - resumed eliquis  -  fobt ordered    #Glaucoma  - continue Dorzolamide eye drops     #GERD  - continue protonix in house     # severe malnutrition  - nutrition is following    DVT Prophylaxis:  - Eliquis    #GOC -  DNR/DNI    dispo: Family wishes for patient to be home with preet lift, hospital bed, WC. CM is aware. resumed eliquis today. will repeat h/h in am. can dc home tomorrow if h/h stable and DME is delivered. 89 year old female with PMHx HTN, HLD, Afib on eliquis, MVP, and arthritis presents to ED s/p fall at home. Patient admitted with ambulatory dysfunction, right knee pain, R/o UTI.     # RT knee Periprosthetic Fracture  -ortho following, imaging showed right knee arthroplasty with probable acute impacted periprosthetic fracture surrounding femoral component of RT knee arthroplasty  -no plans for surgery  -pain management, NWB RT LE with immobilizer, Fall Precautions  -PT recommends home with PT when ready and DME delivered    # UTI  - s/p course of cefriaxone   - urine culture and sensitivities reveal pan sensitive E Coli    # EDGAR  - likely due to dehydration  - resolved  - continue to monitor renal function closely    #Afib; chronic  #HTN, HLD  - on home Cartia XT 180mg ER, continue Diltiazem   - Eliquis : held on admission. resumed 5/14   - on home amlodipine-benazepril 5-10mg PRN. Currently on hold. Will monitor BP and resume as needed  - continue statin     # Anemia  - hb on dec 2024 12. now 8.3  - no apparent ssx of bleeding  - H/H stble.   - resumed eliquis  -  fobt ordered    #Glaucoma  - continue Dorzolamide eye drops     #GERD  - continue protonix in house     # severe malnutrition  - nutrition is following    DVT Prophylaxis:  - Eliquis    #GOC -  DNR/DNI    dispo: Family wishes for patient to be home with preet lift, hospital bed, WC - pending

## 2025-05-15 NOTE — DISCHARGE NOTE PROVIDER - DETAILS OF MALNUTRITION DIAGNOSIS/DIAGNOSES
This patient has been assessed with a concern for Malnutrition and was treated during this hospitalization for the following Nutrition diagnosis/diagnoses:     -  05/14/2025: Severe protein-calorie malnutrition

## 2025-05-15 NOTE — DISCHARGE NOTE PROVIDER - CARE PROVIDER_API CALL
Gabriela Luong  Family Medicine  [address] [place] [address], [address]  [place] [address]  Phone: [phone]  Fax: [phone]  Follow Up Time:     Aren Hamilton  Orthopaedic Surgery  [address] [facility]  [place] [address]  Phone: [phone]  Fax: [phone]  Follow Up Time: 1 week

## 2025-05-15 NOTE — PROGRESS NOTE ADULT - SUBJECTIVE AND OBJECTIVE BOX
Patient is a 89y old  Female who presents with a chief complaint of s/p fall (14 May 2025 10:44)    Patient seen and examined at bedside.  no acute overnight events    ALLERGIES:  No Known Allergies        Vital Signs Last 24 Hrs  T(F): 98.4 (15 May 2025 05:41), Max: 98.4 (15 May 2025 05:41)  HR: 89 (15 May 2025 05:41) (89 - 101)  BP: 124/68 (15 May 2025 05:41) (124/68 - 131/71)  RR: 17 (15 May 2025 05:41) (17 - 17)  SpO2: 93% (15 May 2025 05:41) (92% - 93%)  I&O's Summary    14 May 2025 07:01  -  15 May 2025 07:00  --------------------------------------------------------  IN: 500 mL / OUT: 402 mL / NET: 98 mL      MEDICATIONS:  acetaminophen     Tablet .. 975 milliGRAM(s) Oral every 8 hours  aluminum hydroxide/magnesium hydroxide/simethicone Suspension 30 milliLiter(s) Oral every 4 hours PRN  apixaban 2.5 milliGRAM(s) Oral every 12 hours  atorvastatin 40 milliGRAM(s) Oral at bedtime  diltiazem    milliGRAM(s) Oral daily  dorzolamide 2%/timolol 0.5% Ophthalmic Solution 1 Drop(s) Both EYES two times a day  fluticasone propionate/ salmeterol 100-50 MICROgram(s) Diskus 1 Dose(s) Inhalation two times a day  lactobacillus acidophilus 1 Tablet(s) Oral with lunch  lidocaine   4% Patch 1 Patch Transdermal daily  melatonin 3 milliGRAM(s) Oral at bedtime PRN  multivitamin 1 Tablet(s) Oral daily  ondansetron Injectable 4 milliGRAM(s) IV Push every 8 hours PRN  pantoprazole    Tablet 40 milliGRAM(s) Oral before breakfast  polyethylene glycol 3350 17 Gram(s) Oral at bedtime  traMADol 25 milliGRAM(s) Oral every 6 hours PRN  traMADol 50 milliGRAM(s) Oral every 8 hours PRN      PHYSICAL EXAM:  General: NAD, Alert elderly female, confused  ENT: MMM, no thrush  Neck: Supple, No JVD  Lungs: Clear to auscultation bilaterally, non labored, good air entry  Cardio: RRR, S1/S2, No murmurs  Abdomen: Soft, Nontender, Nondistended; Bowel sounds present  Extremities: No cyanosis, No edema, RT knee swelling in immobilizer    LABS:                        8.0    7.48  )-----------( 176      ( 15 May 2025 07:40 )             24.7     05-15    138  |  107  |  25  ----------------------------<  107  3.9   |  25  |  1.00    Ca    8.9      15 May 2025 07:40  Mg     1.7     05-13                                  Urinalysis Basic - ( 15 May 2025 07:40 )    Color: x / Appearance: x / SG: x / pH: x  Gluc: 107 mg/dL / Ketone: x  / Bili: x / Urobili: x   Blood: x / Protein: x / Nitrite: x   Leuk Esterase: x / RBC: x / WBC x   Sq Epi: x / Non Sq Epi: x / Bacteria: x        Culture - Urine (collected 11 May 2025 19:05)  Source: Clean Catch None  Final Report (14 May 2025 07:27):    >100,000 CFU/ml Escherichia coli  Organism: Escherichia coli (14 May 2025 07:27)  Organism: Escherichia coli (14 May 2025 07:27)      -  Levofloxacin: S <=0.5      -  Tobramycin: S <=2      -  Nitrofurantoin: S <=32 Should not be used to treat pyelonephritis      -  Aztreonam: S <=4      -  Gentamicin: S <=2      -  Cefazolin: S <=2 For uncomplicated UTI with K. pneumoniae, E. coli, or P. mirablis: SKYLAR <=16 is sensitive and SKYLAR >=32 is resistant. This also predicts results for oral agents cefaclor, cefdinir, cefpodoxime, cefprozil, cefuroxime axetil, cephalexin and locarbef for uncomplicated UTI. Note that some isolates may be susceptible to these agents while testing resistant to cefazolin.      -  Cefepime: S <=2      -  Piperacillin/Tazobactam: S <=8      -  Ciprofloxacin: S <=0.25      -  Imipenem: S <=1      -  Ceftriaxone: S <=1      -  Ampicillin: S <=8 These ampicillin results predict results for amoxicillin      Method Type: SKYLAR      -  Meropenem: S <=1      -  Ampicillin/Sulbactam: S <=4/2      -  Cefoxitin: S <=8      -  Cefuroxime: S <=4      -  Amoxicillin/Clavulanic Acid: S <=8/4      -  Trimethoprim/Sulfamethoxazole: S <=0.5/9.5      -  Tigecycline: S <=2 Interpretations based on FDA breakpoints      -  Ertapenem: S <=0.5          RADIOLOGY & ADDITIONAL TESTS:    Care Discussed with Consultants/Other Providers:    Patient is a 89y old  Female who presents with a chief complaint of s/p fall (14 May 2025 10:44)    Patient seen and examined at bedside.  no acute overnight events    ALLERGIES:  No Known Allergies    Vital Signs Last 24 Hrs  T(F): 98.4 (15 May 2025 05:41), Max: 98.4 (15 May 2025 05:41)  HR: 89 (15 May 2025 05:41) (89 - 101)  BP: 124/68 (15 May 2025 05:41) (124/68 - 131/71)  RR: 17 (15 May 2025 05:41) (17 - 17)  SpO2: 93% (15 May 2025 05:41) (92% - 93%)  I&O's Summary    14 May 2025 07:01  -  15 May 2025 07:00  --------------------------------------------------------  IN: 500 mL / OUT: 402 mL / NET: 98 mL    MEDICATIONS:  acetaminophen     Tablet .. 975 milliGRAM(s) Oral every 8 hours  aluminum hydroxide/magnesium hydroxide/simethicone Suspension 30 milliLiter(s) Oral every 4 hours PRN  apixaban 2.5 milliGRAM(s) Oral every 12 hours  atorvastatin 40 milliGRAM(s) Oral at bedtime  diltiazem    milliGRAM(s) Oral daily  dorzolamide 2%/timolol 0.5% Ophthalmic Solution 1 Drop(s) Both EYES two times a day  fluticasone propionate/ salmeterol 100-50 MICROgram(s) Diskus 1 Dose(s) Inhalation two times a day  lactobacillus acidophilus 1 Tablet(s) Oral with lunch  lidocaine   4% Patch 1 Patch Transdermal daily  melatonin 3 milliGRAM(s) Oral at bedtime PRN  multivitamin 1 Tablet(s) Oral daily  ondansetron Injectable 4 milliGRAM(s) IV Push every 8 hours PRN  pantoprazole    Tablet 40 milliGRAM(s) Oral before breakfast  polyethylene glycol 3350 17 Gram(s) Oral at bedtime  traMADol 25 milliGRAM(s) Oral every 6 hours PRN  traMADol 50 milliGRAM(s) Oral every 8 hours PRN    PHYSICAL EXAM:  General: NAD, Alert elderly female, confused  ENT: MMM, no thrush  Neck: Supple, No JVD  Lungs: Clear to auscultation bilaterally, non labored, good air entry  Cardio: RRR, S1/S2, No murmurs  Abdomen: Soft, Nontender, Nondistended; Bowel sounds present  Extremities: No cyanosis, No edema, RT knee swelling in immobilizer    LABS:                        8.0    7.48  )-----------( 176      ( 15 May 2025 07:40 )             24.7     05-15    138  |  107  |  25  ----------------------------<  107  3.9   |  25  |  1.00    Ca    8.9      15 May 2025 07:40  Mg     1.7     05-13    Urinalysis Basic - ( 15 May 2025 07:40 )    Color: x / Appearance: x / SG: x / pH: x  Gluc: 107 mg/dL / Ketone: x  / Bili: x / Urobili: x   Blood: x / Protein: x / Nitrite: x   Leuk Esterase: x / RBC: x / WBC x   Sq Epi: x / Non Sq Epi: x / Bacteria: x    Culture - Urine (collected 11 May 2025 19:05)  Source: Clean Catch None  Final Report (14 May 2025 07:27):    >100,000 CFU/ml Escherichia coli  Organism: Escherichia coli (14 May 2025 07:27)  Organism: Escherichia coli (14 May 2025 07:27)      -  Levofloxacin: S <=0.5      -  Tobramycin: S <=2      -  Nitrofurantoin: S <=32 Should not be used to treat pyelonephritis      -  Aztreonam: S <=4      -  Gentamicin: S <=2      -  Cefazolin: S <=2 For uncomplicated UTI with K. pneumoniae, E. coli, or P. mirablis: SKYLAR <=16 is sensitive and SKYLAR >=32 is resistant. This also predicts results for oral agents cefaclor, cefdinir, cefpodoxime, cefprozil, cefuroxime axetil, cephalexin and locarbef for uncomplicated UTI. Note that some isolates may be susceptible to these agents while testing resistant to cefazolin.      -  Cefepime: S <=2      -  Piperacillin/Tazobactam: S <=8      -  Ciprofloxacin: S <=0.25      -  Imipenem: S <=1      -  Ceftriaxone: S <=1      -  Ampicillin: S <=8 These ampicillin results predict results for amoxicillin      Method Type: SKYLAR      -  Meropenem: S <=1      -  Ampicillin/Sulbactam: S <=4/2      -  Cefoxitin: S <=8      -  Cefuroxime: S <=4      -  Amoxicillin/Clavulanic Acid: S <=8/4      -  Trimethoprim/Sulfamethoxazole: S <=0.5/9.5      -  Tigecycline: S <=2 Interpretations based on FDA breakpoints      -  Ertapenem: S <=0.5    RADIOLOGY & ADDITIONAL TESTS:    Care Discussed with Consultants/Other Providers:

## 2025-05-15 NOTE — DISCHARGE NOTE PROVIDER - NSDCCPCAREPLAN_GEN_ALL_CORE_FT
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  dc home with DME, pain managment     PRINCIPAL DISCHARGE DIAGNOSIS  Diagnosis: Periprosthetic fracture around internal prosthetic right knee joint  Assessment and Plan of Treatment: you were found to have fracture around the right knee prosthesis. seen by orthopedic team. suggested non-operative treatment. not weight bearing status on right leg until seen by ortho. complete home physical therapy. take tylenol for pain control. use lidocian patch every 24 hrs on right knee as needed for pain. ice compression. see Dr. Church in 1 week of discharge. see primary MD in 1-2 weeks. extreme fall precautions is advised.

## 2025-05-15 NOTE — DISCHARGE NOTE PROVIDER - CARE PROVIDERS DIRECT ADDRESSES
[email or web address]

## 2025-05-15 NOTE — DISCHARGE NOTE PROVIDER - HOSPITAL COURSE
Hospital Course  89 year old female with PMHx HTN, HLD, Afib on eliquis, MVP, and arthritis presents to ED s/p fall at home. Patient stated she started to feel weak and pain in her right knee, so she guided herself onto the floor. Patient's aide was present 15 minutes later to assist patient up. Patient denies head strike, denies LOC. Patient endorses right knee pain which she has taken 3 Tylenol for today. Son at the bedside states patient is more confused than usual, suspects a urinary tract infection. Patient is otherwise independent living alone with help of an aide. Patient denies headache, nausea/vomiting, abdominal pain. Patient admitted for ambulatory dysfunction, pain management.    Patient admitted with right knee pain, found with periprosthetic fracture.  No plans for surgery, managed conservatively for pain.  NWB RLE with knee immobilizer. Meets criteria for hospital bed, preet life, wheelchair at home.  Received three days of rocephin for possible UTI, IVF for dehydration.  Medically stable for safe discharge home pending delivery of DME.    Source of Infection:  Antibiotic / Last Day: received three days of rocephin    Palliative Care / Advanced Care Planning  Code Status: DNR  Patient/Family agreeable to Hospice/Palliative (Y/N)?  Summary of Goals of Care Conversation:    Discharging Provider:  Donte Mary NP  Contact Info: Cell [phone] - Please call with any questions or concerns.    Outpatient Provider:            Hospital Course  89 year old female with PMHx HTN, HLD, Afib on eliquis, MVP, and arthritis presents to ED s/p fall at home. Patient stated she started to feel weak and pain in her right knee, so she guided herself onto the floor. Patient's aide was present 15 minutes later to assist patient up. Patient denies head strike, denies LOC. Patient endorses right knee pain which she has taken 3 Tylenol for today. Son at the bedside states patient is more confused than usual, suspects a urinary tract infection. Patient is otherwise independent living alone with help of an aide. Patient denies headache, nausea/vomiting, abdominal pain. Patient admitted for ambulatory dysfunction, pain management.    Patient admitted with right knee pain, found with periprosthetic fracture.  No plans for surgery, managed conservatively for pain.  NWB RLE with knee immobilizer. Meets criteria for hospital bed, preet life, wheelchair at home.  Received three days of rocephin for possible UTI, IVF for dehydration.  Medically stable for safe discharge home pending delivery of DME.    Source of Infection:  Antibiotic / Last Day: received three days of rocephin    Palliative Care / Advanced Care Planning  Code Status: DNR/DNI  Patient/Family agreeable to Hospice/Palliative (Y/N)?Y  Summary of Goals of Care Conversation: going back home with home hospice.     Discharging Provider:  Isabella  Contact Info: Cell [phone] - Please call with any questions or concerns.    Outpatient Provider:            Hospital Course  89 year old female with PMHx HTN, HLD, Afib on eliquis, MVP, and arthritis presents to ED s/p fall at home. Patient stated she started to feel weak and pain in her right knee, so she guided herself onto the floor. Patient's aide was present 15 minutes later to assist patient up. Patient denies head strike, denies LOC. Patient endorses right knee pain which she has taken 3 Tylenol for today. Son at the bedside states patient is more confused than usual, suspects a urinary tract infection. Patient is otherwise independent living alone with help of an aide. Patient denies headache, nausea/vomiting, abdominal pain. Patient admitted for ambulatory dysfunction, pain management.    Patient admitted with right knee pain, found with periprosthetic fracture.  No plans for surgery, managed conservatively for pain.  NWB RLE with knee immobilizer. Meets criteria for hospital bed, preet life, wheelchair at home.  Received three days of rocephin for possible UTI, IVF for dehydration.  Medically stable for safe discharge home pending delivery of DME.    Source of Infection: UTI  Antibiotic / Last Day: completed treatment with Rocephine    Palliative Care / Advanced Care Planning  Code Status: DNR/DNI  Patient/Family agreeable to Hospice/Palliative (Y/N)?Y  Summary of Goals of Care Conversation: going back home with DME [ wheelchair, hospital bed, preet lift].     Discharging Provider:  Shaye   Contact Info: Cell [phone] - Please call with any questions or concerns.    Outpatient Provider: Dr. Gabriela Luong           Hospital Course  89 year old female with PMHx HTN, HLD, Afib on eliquis, MVP, and arthritis presents to ED s/p fall at home. Patient stated she started to feel weak and pain in her right knee, so she guided herself onto the floor. Patient's aide was present 15 minutes later to assist patient up. Patient denies head strike, denies LOC. Patient endorses right knee pain which she has taken 3 Tylenol for today. Son at the bedside states patient is more confused than usual, suspects a urinary tract infection. Patient is otherwise independent living alone with help of an aide. Patient denies headache, nausea/vomiting, abdominal pain. Patient admitted for ambulatory dysfunction, pain management.    Patient admitted with right knee pain, found with periprosthetic fracture.  No plans for surgery, managed conservatively for pain.  NWB RLE with knee immobilizer. Meets criteria for hospital bed, preet life, wheelchair at home.  Received three days of rocephin for possible UTI, IVF for dehydration.  Medically stable for safe discharge home pending delivery of DME.    Source of Infection: UTI  Antibiotic / Last Day: completed treatment with Rocephine    Palliative Care / Advanced Care Planning  Code Status: DNR/DNI  Patient/Family agreeable to Hospice/Palliative (Y/N)?Y  Summary of Goals of Care Conversation: going back home with DME [ wheelchair, hospital bed, preet lift].     Discharging Provider:  Shaye   Contact Info: Cell [phone] - Please call with any questions or concerns.    Outpatient Provider: Dr. Gabriela Luong [ texted on teams and cell]

## 2025-05-16 ENCOUNTER — TRANSCRIPTION ENCOUNTER (OUTPATIENT)
Age: 89
End: 2025-05-16

## 2025-05-16 VITALS
TEMPERATURE: 98 F | SYSTOLIC BLOOD PRESSURE: 130 MMHG | HEART RATE: 81 BPM | OXYGEN SATURATION: 91 % | DIASTOLIC BLOOD PRESSURE: 68 MMHG | RESPIRATION RATE: 18 BRPM

## 2025-05-16 PROCEDURE — 93005 ELECTROCARDIOGRAM TRACING: CPT

## 2025-05-16 PROCEDURE — 82746 ASSAY OF FOLIC ACID SERUM: CPT

## 2025-05-16 PROCEDURE — 81001 URINALYSIS AUTO W/SCOPE: CPT

## 2025-05-16 PROCEDURE — 86850 RBC ANTIBODY SCREEN: CPT

## 2025-05-16 PROCEDURE — 72125 CT NECK SPINE W/O DYE: CPT

## 2025-05-16 PROCEDURE — 97166 OT EVAL MOD COMPLEX 45 MIN: CPT

## 2025-05-16 PROCEDURE — 83735 ASSAY OF MAGNESIUM: CPT

## 2025-05-16 PROCEDURE — 99285 EMERGENCY DEPT VISIT HI MDM: CPT

## 2025-05-16 PROCEDURE — 85025 COMPLETE CBC W/AUTO DIFF WBC: CPT

## 2025-05-16 PROCEDURE — 73562 X-RAY EXAM OF KNEE 3: CPT

## 2025-05-16 PROCEDURE — 73700 CT LOWER EXTREMITY W/O DYE: CPT

## 2025-05-16 PROCEDURE — 80053 COMPREHEN METABOLIC PANEL: CPT

## 2025-05-16 PROCEDURE — 97162 PT EVAL MOD COMPLEX 30 MIN: CPT

## 2025-05-16 PROCEDURE — 70450 CT HEAD/BRAIN W/O DYE: CPT

## 2025-05-16 PROCEDURE — 86901 BLOOD TYPING SEROLOGIC RH(D): CPT

## 2025-05-16 PROCEDURE — 71045 X-RAY EXAM CHEST 1 VIEW: CPT

## 2025-05-16 PROCEDURE — 85730 THROMBOPLASTIN TIME PARTIAL: CPT

## 2025-05-16 PROCEDURE — 86900 BLOOD TYPING SEROLOGIC ABO: CPT

## 2025-05-16 PROCEDURE — 80048 BASIC METABOLIC PNL TOTAL CA: CPT

## 2025-05-16 PROCEDURE — 36415 COLL VENOUS BLD VENIPUNCTURE: CPT

## 2025-05-16 PROCEDURE — 87086 URINE CULTURE/COLONY COUNT: CPT

## 2025-05-16 PROCEDURE — 97530 THERAPEUTIC ACTIVITIES: CPT

## 2025-05-16 PROCEDURE — 76376 3D RENDER W/INTRP POSTPROCES: CPT

## 2025-05-16 PROCEDURE — 82607 VITAMIN B-12: CPT

## 2025-05-16 PROCEDURE — 73552 X-RAY EXAM OF FEMUR 2/>: CPT

## 2025-05-16 PROCEDURE — 85610 PROTHROMBIN TIME: CPT

## 2025-05-16 PROCEDURE — 87186 SC STD MICRODIL/AGAR DIL: CPT

## 2025-05-16 PROCEDURE — 94640 AIRWAY INHALATION TREATMENT: CPT

## 2025-05-16 PROCEDURE — 85027 COMPLETE CBC AUTOMATED: CPT

## 2025-05-16 PROCEDURE — 72192 CT PELVIS W/O DYE: CPT

## 2025-05-16 PROCEDURE — 99239 HOSP IP/OBS DSCHRG MGMT >30: CPT

## 2025-05-16 PROCEDURE — 84484 ASSAY OF TROPONIN QUANT: CPT

## 2025-05-16 PROCEDURE — 96360 HYDRATION IV INFUSION INIT: CPT

## 2025-05-16 RX ORDER — APIXABAN 2.5 MG/1
1 TABLET, FILM COATED ORAL
Qty: 0 | Refills: 0
Start: 2025-05-16

## 2025-05-16 RX ORDER — LACTOBACILLUS ACIDOPHILUS/PECT 75 MM-100
1 CAPSULE ORAL
Refills: 0 | DISCHARGE

## 2025-05-16 RX ORDER — LIDOCAINE HYDROCHLORIDE 20 MG/ML
1 JELLY TOPICAL
Qty: 1 | Refills: 0
Start: 2025-05-16

## 2025-05-16 RX ORDER — LIDOCAINE HYDROCHLORIDE 20 MG/ML
1 JELLY TOPICAL
Qty: 10 | Refills: 0
Start: 2025-05-16

## 2025-05-16 RX ORDER — DILTIAZEM HYDROCHLORIDE 120 MG/1
180 CAPSULE, EXTENDED RELEASE ORAL
Refills: 0 | DISCHARGE

## 2025-05-16 RX ORDER — APIXABAN 2.5 MG/1
1 TABLET, FILM COATED ORAL
Refills: 0 | DISCHARGE

## 2025-05-16 RX ORDER — DILTIAZEM HYDROCHLORIDE 120 MG/1
1 CAPSULE, EXTENDED RELEASE ORAL
Qty: 0 | Refills: 0 | DISCHARGE
Start: 2025-05-16

## 2025-05-16 RX ADMIN — TRAMADOL HYDROCHLORIDE 50 MILLIGRAM(S): 50 TABLET, FILM COATED ORAL at 10:44

## 2025-05-16 RX ADMIN — TRAMADOL HYDROCHLORIDE 50 MILLIGRAM(S): 50 TABLET, FILM COATED ORAL at 11:15

## 2025-05-16 RX ADMIN — Medication 975 MILLIGRAM(S): at 07:00

## 2025-05-16 RX ADMIN — LIDOCAINE HYDROCHLORIDE 1 PATCH: 20 JELLY TOPICAL at 12:24

## 2025-05-16 RX ADMIN — DILTIAZEM HYDROCHLORIDE 180 MILLIGRAM(S): 120 CAPSULE, EXTENDED RELEASE ORAL at 05:42

## 2025-05-16 RX ADMIN — APIXABAN 2.5 MILLIGRAM(S): 2.5 TABLET, FILM COATED ORAL at 05:42

## 2025-05-16 RX ADMIN — Medication 1 DOSE(S): at 09:18

## 2025-05-16 RX ADMIN — Medication 1 TABLET(S): at 12:25

## 2025-05-16 RX ADMIN — Medication 40 MILLIGRAM(S): at 05:42

## 2025-05-16 RX ADMIN — Medication 975 MILLIGRAM(S): at 05:41

## 2025-05-16 RX ADMIN — DORZOLAMIDE HYDROCHLORIDE AND TIMOLOL MALEATE 1 DROP(S): 20; 5 SOLUTION/ DROPS OPHTHALMIC at 05:42

## 2025-05-16 RX ADMIN — Medication 1 TABLET(S): at 12:24

## 2025-05-16 NOTE — PROGRESS NOTE ADULT - NUTRITIONAL ASSESSMENT
This patient has been assessed with a concern for Malnutrition and has been determined to have a diagnosis/diagnoses of Severe protein-calorie malnutrition.    This patient is being managed with:   Diet Regular-  Entered: May 11 2025  4:58PM    The following pending diet order is being considered for treatment of Severe protein-calorie malnutrition:  Diet Regular-  Supplement Feeding Modality:  Oral  Ensure Plus High Protein Cans or Servings Per Day:  1       Frequency:  Two Times a day  Entered: May 14 2025 10:08AM  
This patient has been assessed with a concern for Malnutrition and has been determined to have a diagnosis/diagnoses of Severe protein-calorie malnutrition.    This patient is being managed with:   Diet Regular-  Supplement Feeding Modality:  Oral  Ensure Plus High Protein Cans or Servings Per Day:  1       Frequency:  Two Times a day  Entered: May 14 2025  1:46PM  
This patient has been assessed with a concern for Malnutrition and has been determined to have a diagnosis/diagnoses of Severe protein-calorie malnutrition.    This patient is being managed with:   Diet Regular-  Supplement Feeding Modality:  Oral  Ensure Plus High Protein Cans or Servings Per Day:  1       Frequency:  Two Times a day  Entered: May 14 2025  1:46PM

## 2025-05-16 NOTE — PROGRESS NOTE ADULT - SUBJECTIVE AND OBJECTIVE BOX
Medicine Progress Note    Patient is a 89y old  Female who presents with a chief complaint of s/p fall (15 May 2025 11:43)      SUBJECTIVE / OVERNIGHT EVENTS:  seen and examined  Chart reviewed  No overnight events  Limb weakness improving with therapy  BM+  right knee pain, controlled with pain meds and rest  No complaints  wants to go home      ROS:  denied fever/chills/CP/SOB/cough/palpitation/dizziness/abdominal pian/nausea/vomiting/diarrhoea/constipation/dysuria/headaches.all other ROS neg    MEDICATIONS  (STANDING):  apixaban 2.5 milliGRAM(s) Oral every 12 hours  atorvastatin 40 milliGRAM(s) Oral at bedtime  diltiazem    milliGRAM(s) Oral daily  dorzolamide 2%/timolol 0.5% Ophthalmic Solution 1 Drop(s) Both EYES two times a day  fluticasone propionate/ salmeterol 100-50 MICROgram(s) Diskus 1 Dose(s) Inhalation two times a day  lactobacillus acidophilus 1 Tablet(s) Oral with lunch  lidocaine   4% Patch 1 Patch Transdermal daily  multivitamin 1 Tablet(s) Oral daily  pantoprazole    Tablet 40 milliGRAM(s) Oral before breakfast  polyethylene glycol 3350 17 Gram(s) Oral at bedtime    MEDICATIONS  (PRN):  aluminum hydroxide/magnesium hydroxide/simethicone Suspension 30 milliLiter(s) Oral every 4 hours PRN Dyspepsia  melatonin 3 milliGRAM(s) Oral at bedtime PRN Insomnia  ondansetron Injectable 4 milliGRAM(s) IV Push every 8 hours PRN Nausea and/or Vomiting  traMADol 25 milliGRAM(s) Oral every 6 hours PRN Moderate Pain (4 - 6)    CAPILLARY BLOOD GLUCOSE        I&O's Summary    15 May 2025 07:01  -  16 May 2025 07:00  --------------------------------------------------------  IN: 0 mL / OUT: 460 mL / NET: -460 mL        PHYSICAL EXAM:  Vital Signs Last 24 Hrs  T(C): 36.8 (16 May 2025 05:38), Max: 36.8 (15 May 2025 18:52)  T(F): 98.3 (16 May 2025 05:38), Max: 98.3 (16 May 2025 05:38)  HR: 91 (16 May 2025 09:14) (71 - 96)  BP: 142/75 (16 May 2025 05:38) (122/78 - 142/75)  BP(mean): --  RR: 18 (16 May 2025 05:38) (16 - 18)  SpO2: 95% (16 May 2025 09:14) (94% - 95%)    Parameters below as of 16 May 2025 09:14  Patient On (Oxygen Delivery Method): room air      GENERAL: Not in distress. Alert    HEENT: clear conjuctiva, MMM. no pallor or icterus  CARDIOVASCULAR: RRR S1, S2. No murmur/rubs/gallop  LUNGS: BLAE+, no rales, no wheezing, no rhonchi.    ABDOMEN: ND. Soft,  NT, no guarding / rebound / rigidity. BS normoactive  BACK: No spine tenderness.  EXTREMITIES: no edema. no leg or calf TP. right knee swelling. no redness. +TP. right knee bruise  SKIN: warm and dry  PSYCHIATRIC: Calm.  No agitation.  CNS: AAO*2-3. moves limbs, follows commands      LABS:                        8.0    7.48  )-----------( 176      ( 15 May 2025 07:40 )             24.7     05-15    138  |  107  |  25[H]  ----------------------------<  107[H]  3.9   |  25  |  1.00    Ca    8.9      15 May 2025 07:40            Urinalysis Basic - ( 15 May 2025 07:40 )    Color: x / Appearance: x / SG: x / pH: x  Gluc: 107 mg/dL / Ketone: x  / Bili: x / Urobili: x   Blood: x / Protein: x / Nitrite: x   Leuk Esterase: x / RBC: x / WBC x   Sq Epi: x / Non Sq Epi: x / Bacteria: x            RADIOLOGY & ADDITIONAL TESTS:  Imaging from Last 24 Hours:    Electrocardiogram/QTc Interval:    COORDINATION OF CARE:  Care Discussed with Consultants/Other Providers:

## 2025-05-16 NOTE — DISCHARGE NOTE NURSING/CASE MANAGEMENT/SOCIAL WORK - PATIENT PORTAL LINK FT
You can access the FollowMyHealth Patient Portal offered by [facility] by registering at the following website: [email or web address]. By joining [facility]’s FollowMyHealth portal, you will also be able to view your health information using other applications (apps) compatible with our system.

## 2025-05-16 NOTE — DISCHARGE NOTE NURSING/CASE MANAGEMENT/SOCIAL WORK - FINANCIAL ASSISTANCE
[facility] provides services at a reduced cost to those who are determined to be eligible through [facility]’s financial assistance program. Information regarding [facility]’s financial assistance program can be found by going to [email or web address] or by calling [phone].

## 2025-05-16 NOTE — PROGRESS NOTE ADULT - ASSESSMENT
89 year old female with PMHx HTN, HLD, Afib on eliquis, MVP, and arthritis presents to ED s/p fall at home. Patient admitted with ambulatory dysfunction, right knee pain, R/o UTI.     # RT knee Periprosthetic Fracture  -ortho following, imaging showed right knee arthroplasty with probable acute impacted periprosthetic fracture surrounding femoral component of RT knee arthroplasty  -no plans for surgery  -pain management, NWB RT LE with immobilizer, Fall Precautions  -PT recommends home with PT when ready and DME delivered    # UTI  - s/p course of ceftriaxone   - urine culture and sensitivities reveal pan sensitive E Coli    # EDGAR  - likely due to dehydration  - resolved  - continue to monitor renal function closely    #Afib; chronic  #HTN, HLD  - on home Cartia XT 180mg ER, continue Diltiazem   - Eliquis : held on admission. resumed 5/14   - home amlodipine-benazepril 5-10mg PRN. Currently on hold. Will monitor BP and resume as needed  - continue statin     # Anemia  - hb on dec 2024 12. now 8.3  - no apparent ssx of bleeding  - H/H stble.   - resumed eliquis  -  fobt ordered    #Glaucoma  - continue Dorzolamide eye drops     #GERD  - continue protonix in house     # severe malnutrition  - nutrition is following    DVT Prophylaxis:  - Eliquis    #GOC -  DNR/DNI    dispo: Family wishes for patient to be home with preet lift, hospital bed, WC - DC pending DME delivery. medically cleared.     updated daughter Felipa at bedside

## 2025-06-07 NOTE — DIETITIAN NUTRITION RISK NOTIFICATION - PHYSICAL ASSESSMENT CLAVICLES
Rosemary Motta is a 80 y.o. female admitted for Diverticulitis. Pharmacy reviewed the patient's irodk-xh-zrhhnteyy medications and allergies for accuracy.    The list below reflects the PTA list prior to pharmacy medication history. A summary a changes to the PTA medication list has been listed below. Please review each medication in order reconciliation for additional clarification and justification.    Source of information:  Surescripts and T2P  Medications added:  Calcium +D3 500mg 2t every day   Miralax powder 8.5g every day     Medications modified:  Anastrozole 1mg --> every day   Artificial tears 1gtt ou qid --> 1gtt ou qid prn  Asa 81mg --> 81mg every day   Metoprolol Tart 50mg bid --> 50mg 3t bid    Medications to be removed:  Calcium citrate 250mg  Rosuvastatin 5mg  Medications of concern:      Prior to Admission Medications   Prescriptions Last Dose Informant Patient Reported? Taking?   anastrozole (Arimidex) 1 mg tablet   Yes No   artificial tears, dextran-hypomel-glycerin, 0.1-0.3-0.2 % ophthalmic solution   Yes No   Sig: Administer 1 drop into affected eye(s) 4 times a day.  1 drop(s) to each affected eye 4 times a day   aspirin 81 mg EC tablet   Yes No   Sig: Aspirin 81 MG Oral Tablet Delayed Release   Refills: 0        Start : 30-Apr-2018   Active   calcium citrate 250 mg calcium tablet   Yes No   Sig: Calcium Citrate 1040 MG TABS   Refills: 0        Start : 20-Sep-2021   Active   levothyroxine (Synthroid, Levoxyl) 25 mcg tablet   Yes No   Sig: Take 1 tablet (25 mcg) by mouth once daily.   metoprolol tartrate (Lopressor) 50 mg tablet   Yes No   Sig: Take 1 tablet by mouth twice a day.   olmesartan (BENIcar) 40 mg tablet   Yes No   Sig: Take 1 tablet (40 mg) by mouth once daily.   rosuvastatin (Crestor) 5 mg tablet   Yes No   Sig: Take 1 tablet (5 mg) by mouth once daily.      Facility-Administered Medications: None       Autumn Lou    Message sent to attending on medications added to list and  that the patient is not taking rosuvastatin.     severe

## 2025-07-21 ENCOUNTER — TRANSCRIPTION ENCOUNTER (OUTPATIENT)
Age: 89
End: 2025-07-21

## 2025-09-02 ENCOUNTER — RX RENEWAL (OUTPATIENT)
Age: 89
End: 2025-09-02